# Patient Record
Sex: FEMALE | Race: BLACK OR AFRICAN AMERICAN | Employment: OTHER | URBAN - METROPOLITAN AREA
[De-identification: names, ages, dates, MRNs, and addresses within clinical notes are randomized per-mention and may not be internally consistent; named-entity substitution may affect disease eponyms.]

---

## 2017-04-27 ENCOUNTER — APPOINTMENT (OUTPATIENT)
Dept: PHYSICAL THERAPY | Age: 67
End: 2017-04-27

## 2017-05-20 ENCOUNTER — HOSPITAL ENCOUNTER (OUTPATIENT)
Age: 67
Discharge: HOME | End: 2017-05-20
Attending: OTOLARYNGOLOGY
Payer: MEDICARE

## 2017-05-20 DIAGNOSIS — R42 DIZZINESS AND GIDDINESS: ICD-10-CM

## 2017-05-20 DIAGNOSIS — H90.5 SENSORINEURAL HEARING LOSS: ICD-10-CM

## 2017-05-20 DIAGNOSIS — H93.11 TINNITUS OF RIGHT EAR: ICD-10-CM

## 2017-05-20 DIAGNOSIS — H81.4 VERTIGO OF CENTRAL ORIGIN: ICD-10-CM

## 2017-05-20 DIAGNOSIS — G43.801 OTHER MIGRAINE, NOT INTRACTABLE, WITH STATUS MIGRAINOSUS: ICD-10-CM

## 2017-05-20 DIAGNOSIS — H93.8X2 OTHER SPECIFIED DISORDERS OF LEFT EAR: ICD-10-CM

## 2017-05-20 PROCEDURE — 70551 MRI BRAIN STEM W/O DYE: CPT

## 2017-05-26 ENCOUNTER — OFFICE VISIT (OUTPATIENT)
Dept: FAMILY MEDICINE CLINIC | Age: 67
End: 2017-05-26

## 2017-05-26 VITALS
WEIGHT: 214 LBS | RESPIRATION RATE: 12 BRPM | DIASTOLIC BLOOD PRESSURE: 84 MMHG | HEART RATE: 78 BPM | BODY MASS INDEX: 35.65 KG/M2 | SYSTOLIC BLOOD PRESSURE: 132 MMHG | TEMPERATURE: 98.2 F | HEIGHT: 65 IN | OXYGEN SATURATION: 97 %

## 2017-05-26 DIAGNOSIS — Z13.39 SCREENING FOR ALCOHOLISM: ICD-10-CM

## 2017-05-26 DIAGNOSIS — Z71.89 ACP (ADVANCE CARE PLANNING): ICD-10-CM

## 2017-05-26 DIAGNOSIS — Z00.00 ROUTINE GENERAL MEDICAL EXAMINATION AT A HEALTH CARE FACILITY: Primary | ICD-10-CM

## 2017-05-26 NOTE — PATIENT INSTRUCTIONS
Advance Directives: Care Instructions  Your Care Instructions  An advance directive is a legal way to state your wishes at the end of your life. It tells your family and your doctor what to do if you can no longer say what you want. There are two main types of advance directives. You can change them any time that your wishes change. · A living will tells your family and your doctor your wishes about life support and other treatment. · A durable power of  for health care lets you name a person to make treatment decisions for you when you can't speak for yourself. This person is called a health care agent. If you do not have an advance directive, decisions about your medical care may be made by a doctor or a  who doesn't know you. It may help to think of an advance directive as a gift to the people who care for you. If you have one, they won't have to make tough decisions by themselves. Follow-up care is a key part of your treatment and safety. Be sure to make and go to all appointments, and call your doctor if you are having problems. It's also a good idea to know your test results and keep a list of the medicines you take. How can you care for yourself at home? · Discuss your wishes with your loved ones and your doctor. This way, there are no surprises. · Many states have a unique form. Or you might use a universal form that has been approved by many states. This kind of form can sometimes be completed and stored online. Your electronic copy will then be available wherever you have a connection to the Internet. In most cases, doctors will respect your wishes even if you have a form from a different state. · You don't need a  to do an advance directive. But you may want to get legal advice. · Think about these questions when you prepare an advance directive:  ¨ Who do you want to make decisions about your medical care if you are not able to?  Many people choose a family member or close friend. ¨ Do you know enough about life support methods that might be used? If not, talk to your doctor so you understand. ¨ What are you most afraid of that might happen? You might be afraid of having pain, losing your independence, or being kept alive by machines. ¨ Where would you prefer to die? Choices include your home, a hospital, or a nursing home. ¨ Would you like to have information about hospice care to support you and your family? ¨ Do you want to donate organs when you die? ¨ Do you want certain Religion practices performed before you die? If so, put your wishes in the advance directive. · Read your advance directive every year, and make changes as needed. When should you call for help? Be sure to contact your doctor if you have any questions. Where can you learn more? Go to http://che-meli.info/. Enter R264 in the search box to learn more about \"Advance Directives: Care Instructions. \"  Current as of: November 17, 2016  Content Version: 11.2  © 6804-4542 [employer]. Care instructions adapted under license by [employer] (which disclaims liability or warranty for this information). If you have questions about a medical condition or this instruction, always ask your healthcare professional. [address] any warranty or liability for your use of this information.

## 2017-05-26 NOTE — ACP (ADVANCE CARE PLANNING)
Advance Care Planning (ACP) Provider Conversation Snapshot    Date of ACP Conversation: 05/26/17  Persons included in Conversation:  patient and family  Length of ACP Conversation in minutes:  16 minutes    Authorized Decision Maker (if patient is incapable of making informed decisions):    This person is:   Healthcare Agent/Medical Power of  under Advance Directive          For Patients with Decision Making Capacity:   Values/Goals: Exploration of values, goals, and preferences if recovery is not expected, even with continued medical treatment in the event of:  Imminent death    Conversation Outcomes / Follow-Up Plan:   Recommended completion of Advance Directive form after review of ACP materials and conversation with prospective healthcare agent

## 2017-05-26 NOTE — MR AVS SNAPSHOT
Visit Information Date & Time Provider Department Dept. Phone Encounter #  
 5/26/2017  2:30 PM Joy Reilly MD [facility] [phone] [id number] Upcoming Health Maintenance Date Due Hepatitis C Screening 1950 DTaP/Tdap/Td series (1 - Tdap) 12/3/1973 PAP AKA CERVICAL CYTOLOGY 12/3/1973 FOBT Q 1 YEAR AGE 50-75 12/3/2002 ZOSTER VACCINE AGE 60> 12/3/2012 BREAST CANCER SCRN MAMMOGRAM 3/13/2017 INFLUENZA AGE 9 TO ADULT 8/1/2017 Allergies as of 5/26/2017  Review Complete On: 5/26/2017 By: Joy Reilly MD  
  
 Severity Noted Reaction Type Reactions Codeine  11/15/2016    Other (comments)  
 hallucinations Current Immunizations  Never Reviewed No immunizations on file. Not reviewed this visit You Were Diagnosed With   
  
 Codes Comments Routine general medical examination at a health care facility    -  Primary ICD-10-CM: Z00.00 ICD-9-CM: V70.0 Screening for alcoholism     ICD-10-CM: Z13.89 ICD-9-CM: V79.1 ACP (advance care planning)     ICD-10-CM: Z71.89 ICD-9-CM: V65.49 Vitals BP Pulse Temp Resp Height(growth percentile) Weight(growth percentile) 132/84 (BP 1 Location: Right arm, BP Patient Position: Sitting) 78 98.2 °F (36.8 °C) (Oral) 12 5' 5.25\" (1.657 m) 214 lb (97.1 kg) SpO2 BMI OB Status Smoking Status 97% 35.34 kg/m2 Hysterectomy Never Smoker Vitals History BMI and BSA Data Body Mass Index Body Surface Area  
 35.34 kg/m 2 2.11 m 2 Your Updated Medication List  
  
   
This list is accurate as of: 5/26/17  2:40 PM.  Always use your most recent med list.  
  
  
  
  
 ALPHAGAN P 0.15 % ophthalmic solution Generic drug:  brimonidine  
  
 dorzolamide 2 % ophthalmic solution Commonly known as:  TRUSOPT  
  
 dorzolamide-timolol 22.3-6.8 mg/mL ophthalmic solution Commonly known as:  COSOPT  
  
 erythromycin ophthalmic ointment Commonly known as:  ILOTYCIN  
  
 latanoprost 0.005 % ophthalmic solution Commonly known as:  XALATAN  
  
 multivitamin tablet Commonly known as:  ONE A DAY Take 1 Tab by mouth daily. NIFEdipine ER 60 mg ER tablet Commonly known as:  ADALAT CC Take 60 mg by mouth daily. REFRESH CELLUVISC 1 % ophthalmic solution Generic drug:  carboxymethylcellulose sodium Patient Instructions Advance Directives: Care Instructions Your Care Instructions An advance directive is a legal way to state your wishes at the end of your life. It tells your family and your doctor what to do if you can no longer say what you want. There are two main types of advance directives. You can change them any time that your wishes change. · A living will tells your family and your doctor your wishes about life support and other treatment. · A durable power of  for health care lets you name a person to make treatment decisions for you when you can't speak for yourself. This person is called a health care agent. If you do not have an advance directive, decisions about your medical care may be made by a doctor or a  who doesn't know you. It may help to think of an advance directive as a gift to the people who care for you. If you have one, they won't have to make tough decisions by themselves. Follow-up care is a key part of your treatment and safety. Be sure to make and go to all appointments, and call your doctor if you are having problems. It's also a good idea to know your test results and keep a list of the medicines you take. How can you care for yourself at home? · Discuss your wishes with your loved ones and your doctor. This way, there are no surprises. · Many states have a unique form. Or you might use a universal form that has been approved by many states. This kind of form can sometimes be completed and stored online.  Your electronic copy will then be available wherever you have a connection to the Internet. In most cases, doctors will respect your wishes even if you have a form from a different state. · You don't need a  to do an advance directive. But you may want to get legal advice. · Think about these questions when you prepare an advance directive: ¨ Who do you want to make decisions about your medical care if you are not able to? Many people choose a family member or close friend. ¨ Do you know enough about life support methods that might be used? If not, talk to your doctor so you understand. ¨ What are you most afraid of that might happen? You might be afraid of having pain, losing your independence, or being kept alive by machines. ¨ Where would you prefer to die? Choices include your home, a hospital, or a nursing home. ¨ Would you like to have information about hospice care to support you and your family? ¨ Do you want to donate organs when you die? ¨ Do you want certain Voodoo practices performed before you die? If so, put your wishes in the advance directive. · Read your advance directive every year, and make changes as needed. When should you call for help? Be sure to contact your doctor if you have any questions. Where can you learn more? Go to http://che-meli.info/. Enter R264 in the search box to learn more about \"Advance Directives: Care Instructions. \" Current as of: November 17, 2016 Content Version: 11.2 © 3630-6113 [employer]. Care instructions adapted under license by [employer] (which disclaims liability or warranty for this information). If you have questions about a medical condition or this instruction, always ask your healthcare professional. [address] any warranty or liability for your use of this information. Introducing [facility]!    
 Delisa Jansen introduces Sendbloom patient portal. Now you can access parts of your medical record, email your doctor's office, and request medication refills online. 1. In your internet browser, go to [email or web address] 2. Click on the First Time User? Click Here link in the Sign In box. You will see the New Member Sign Up page. 3. Enter your AutoMed[employer] Access Code exactly as it appears below. You will not need to use this code after youve completed the sign-up process. If you do not sign up before the expiration date, you must request a new code. · Auto[employer] Access Code: [email or web address] Expires: 7/19/2017  8:31 AM 
 
4. Enter the last four digits of your Social Security Number (xxxx) and Date of Birth (mm/dd/yyyy) as indicated and click Submit. You will be taken to the next sign-up page. 5. Create a AutoMedx ID. This will be your AutoMedx login ID and cannot be changed, so think of one that is secure and easy to remember. 6. Create a Auto[employer] password. You can change your password at any time. 7. Enter your Password Reset Question and Answer. This can be used at a later time if you forget your password. 8. Enter your e-mail address. You will receive e-mail notification when new information is available in [address] 9. Click Sign Up. You can now view and download portions of your medical record. 10. Click the Download Summary menu link to download a portable copy of your medical information. If you have questions, please visit the Frequently Asked Questions section of the Auto[employer] website. Remember, Auto[employer] is NOT to be used for urgent needs. For medical emergencies, dial 911. Now available from your iPhone and Android! Please provide this summary of care documentation to your next provider. Your primary care clinician is listed as [address]. If you have any questions after today's visit, please call [phone].

## 2017-05-26 NOTE — PROGRESS NOTES
1. Have you been to the ER, urgent care clinic since your last visit? Hospitalized since your last visit? No    2. Have you seen or consulted any other health care providers outside of the [address] [place] [address] since your last visit? Include any pap smears or colon screening. No    This is an Initial Medicare Annual Wellness Exam (AWV) (Performed 12 months after IPPE or effective date of Medicare Part B enrollment, Once in a lifetime)    I have reviewed the patient's medical history in detail and updated the computerized patient record. History     Past Medical History:   Diagnosis Date    High blood pressure     Menopause       Past Surgical History:   Procedure Laterality Date    HX HYSTERECTOMY      1995    HX OTHER SURGICAL      eye surgery      Current Outpatient Prescriptions   Medication Sig Dispense Refill    NIFEdipine ER (ADALAT CC) 60 mg ER tablet Take 60 mg by mouth daily.  multivitamin (ONE A DAY) tablet Take 1 Tab by mouth daily.       dorzolamide (TRUSOPT) 2 % ophthalmic solution       dorzolamide-timolol (COSOPT) 22.3-6.8 mg/mL ophthalmic solution       ALPHAGAN P 0.15 % ophthalmic solution       latanoprost (XALATAN) 0.005 % ophthalmic solution       REFRESH CELLUVISC 1 % ophthalmic solution       erythromycin (ILOTYCIN) ophthalmic ointment        Allergies   Allergen Reactions    Codeine Other (comments)     hallucinations     Family History   Problem Relation Age of Onset    Breast Cancer Mother     Breast Cancer Other      Social History   Substance Use Topics    Smoking status: Never Smoker    Smokeless tobacco: Not on file    Alcohol use No     Patient Active Problem List   Diagnosis Code    Essential hypertension I10         Depression Risk Factor Screening:     PHQ over the last two weeks 5/26/2017   Little interest or pleasure in doing things Not at all   Feeling down, depressed or hopeless Not at all   Total Score PHQ 2 0     Alcohol Risk Factor Screening: On any occasion during the past 3 months, have you had more than 3 drinks containing alcohol? No    Do you average more than 7 drinks per week? No    Functional Ability and Level of Safety:     Hearing Loss   Whisper test failed. Activities of Daily Living   Self-care. Requires assistance with: no ADLs    Fall Risk   No flowsheet data found. Abuse Screen   Patient is not abused    Review of Systems   Not required    Physical Examination     No exam data present    Evaluation of Cognitive Function:  Mood/affect:  neutral  Appearance: age appropriate and within normal Limits  Family member/caregiver input:  present    No exam performed today, NO exam warranted. Patient Care Team:  Denis Ferreira MD as PCP - [facility])    Advice/Referrals/Counseling   Education and counseling provided:  End-of-Life planning (with patient's consent)      Assessment/Plan   Patient given information on Advanced Directive.     Copies of 5 year plan given to patient    Denis Ferreira MD

## 2017-06-20 ENCOUNTER — PATIENT OUTREACH (OUTPATIENT)
Dept: FAMILY MEDICINE CLINIC | Age: 67
End: 2017-06-20

## 2017-06-21 ENCOUNTER — PATIENT OUTREACH (OUTPATIENT)
Dept: FAMILY MEDICINE CLINIC | Age: 67
End: 2017-06-21

## 2017-07-13 ENCOUNTER — PATIENT OUTREACH (OUTPATIENT)
Dept: FAMILY MEDICINE CLINIC | Age: 67
End: 2017-07-13

## 2017-07-13 ENCOUNTER — TELEPHONE (OUTPATIENT)
Dept: FAMILY MEDICINE CLINIC | Age: 67
End: 2017-07-13

## 2017-07-13 DIAGNOSIS — Z12.31 ENCOUNTER FOR SCREENING MAMMOGRAM FOR BREAST CANCER: Primary | ICD-10-CM

## 2017-07-26 ENCOUNTER — PATIENT OUTREACH (OUTPATIENT)
Dept: FAMILY MEDICINE CLINIC | Age: 67
End: 2017-07-26

## 2017-08-14 ENCOUNTER — PATIENT OUTREACH (OUTPATIENT)
Dept: FAMILY MEDICINE CLINIC | Age: 67
End: 2017-08-14

## 2017-09-06 ENCOUNTER — PATIENT OUTREACH (OUTPATIENT)
Dept: FAMILY MEDICINE CLINIC | Age: 67
End: 2017-09-06

## 2017-09-06 NOTE — PROGRESS NOTES
[place] health screenings:    Ms Giulia Peñaloza is deaf so I have been attempting to complete her screenings or @ least requesting the doctor's name where she may have completed her screenings, through her . Mr Giulia Peñaloza has always been very cordial during our conversations but is not always responding to my calls or my suggestions for care and f/u. I am therefore closing this episode of care and I have left him a message this morning to let him know I would not be calling again but encouraged him when he is serious about completing the screenings for his wife I would be more than willing to work with him.

## 2017-12-11 ENCOUNTER — TELEPHONE (OUTPATIENT)
Dept: FAMILY MEDICINE CLINIC | Age: 67
End: 2017-12-11

## 2017-12-11 NOTE — TELEPHONE ENCOUNTER
called the office today and states he needs his wife seen, stated it was emergent. She is forgetting stuff and having psychiatric issues. He states he is coming up here and sitting until she can be seen. I asked him to please hold and made Go Felder aware and had her talk to him and triage the call.

## 2017-12-11 NOTE — TELEPHONE ENCOUNTER
Spoke to Mr. Claudia Landry. I could hear patient yelling in the background. I asked him what is going on and he said that patient has not been taking her medication, she is 'self diagnosing', her anxiety his high, she is 'not being very nice', screaming, and pacing back and forth. After consulting with dr Ben Greene, he stated that pt needs to go to ER for eval. Gave message to Mr. Claudia Landry. He said 'well I'm bringing her there and will wait to be seen.' I advised him to take her to the ER as there is nothing we can do for her here. He agreed to this plan. Sadaf Kincaid

## 2017-12-15 ENCOUNTER — OFFICE VISIT (OUTPATIENT)
Dept: FAMILY MEDICINE CLINIC | Age: 67
End: 2017-12-15

## 2017-12-15 ENCOUNTER — HOSPITAL ENCOUNTER (OUTPATIENT)
Dept: LAB | Age: 67
Discharge: HOME | End: 2017-12-15
Payer: MEDICARE

## 2017-12-15 VITALS
SYSTOLIC BLOOD PRESSURE: 136 MMHG | HEART RATE: 79 BPM | OXYGEN SATURATION: 98 % | RESPIRATION RATE: 16 BRPM | HEIGHT: 66 IN | DIASTOLIC BLOOD PRESSURE: 74 MMHG | BODY MASS INDEX: 31.18 KG/M2 | WEIGHT: 194 LBS

## 2017-12-15 DIAGNOSIS — E66.09 CLASS 1 OBESITY DUE TO EXCESS CALORIES WITHOUT SERIOUS COMORBIDITY WITH BODY MASS INDEX (BMI) OF 31.0 TO 31.9 IN ADULT: ICD-10-CM

## 2017-12-15 DIAGNOSIS — I10 ESSENTIAL HYPERTENSION: ICD-10-CM

## 2017-12-15 DIAGNOSIS — I10 ESSENTIAL HYPERTENSION: Primary | ICD-10-CM

## 2017-12-15 DIAGNOSIS — Z11.59 NEED FOR HEPATITIS C SCREENING TEST: ICD-10-CM

## 2017-12-15 DIAGNOSIS — Z96.21 COCHLEAR IMPLANT IN PLACE: ICD-10-CM

## 2017-12-15 LAB
ALBUMIN SERPL-MCNC: 3.5 G/DL (ref 3.4–5)
ALBUMIN/GLOB SERPL: 0.9 {RATIO} (ref 0.8–1.7)
ALP SERPL-CCNC: 91 U/L (ref 45–117)
ALT SERPL-CCNC: 32 U/L (ref 13–56)
ANION GAP SERPL CALC-SCNC: 8 MMOL/L (ref 3–18)
AST SERPL-CCNC: 28 U/L (ref 15–37)
BASOPHILS # BLD: 0 K/UL (ref 0–0.06)
BASOPHILS NFR BLD: 0 % (ref 0–2)
BILIRUB SERPL-MCNC: 0.3 MG/DL (ref 0.2–1)
BUN SERPL-MCNC: 28 MG/DL (ref 7–18)
BUN/CREAT SERPL: 24 (ref 12–20)
CALCIUM SERPL-MCNC: 9.4 MG/DL (ref 8.5–10.1)
CHLORIDE SERPL-SCNC: 103 MMOL/L (ref 100–108)
CHOLEST SERPL-MCNC: 219 MG/DL
CO2 SERPL-SCNC: 29 MMOL/L (ref 21–32)
CREAT SERPL-MCNC: 1.18 MG/DL (ref 0.6–1.3)
DIFFERENTIAL METHOD BLD: ABNORMAL
EOSINOPHIL # BLD: 0 K/UL (ref 0–0.4)
EOSINOPHIL NFR BLD: 0 % (ref 0–5)
ERYTHROCYTE [DISTWIDTH] IN BLOOD BY AUTOMATED COUNT: 14.8 % (ref 11.6–14.5)
GLOBULIN SER CALC-MCNC: 3.8 G/DL (ref 2–4)
GLUCOSE SERPL-MCNC: 82 MG/DL (ref 74–99)
HCT VFR BLD AUTO: 36.2 % (ref 35–45)
HDLC SERPL-MCNC: 97 MG/DL (ref 40–60)
HDLC SERPL: 2.3 {RATIO} (ref 0–5)
HGB BLD-MCNC: 11.2 G/DL (ref 12–16)
LDLC SERPL CALC-MCNC: 109.6 MG/DL (ref 0–100)
LIPID PROFILE,FLP: ABNORMAL
LYMPHOCYTES # BLD: 1.5 K/UL (ref 0.9–3.6)
LYMPHOCYTES NFR BLD: 28 % (ref 21–52)
MCH RBC QN AUTO: 29.2 PG (ref 24–34)
MCHC RBC AUTO-ENTMCNC: 30.9 G/DL (ref 31–37)
MCV RBC AUTO: 94.5 FL (ref 74–97)
MONOCYTES # BLD: 0.5 K/UL (ref 0.05–1.2)
MONOCYTES NFR BLD: 11 % (ref 3–10)
NEUTS SEG # BLD: 3.1 K/UL (ref 1.8–8)
NEUTS SEG NFR BLD: 61 % (ref 40–73)
PLATELET # BLD AUTO: 195 K/UL (ref 135–420)
PMV BLD AUTO: 11.6 FL (ref 9.2–11.8)
POTASSIUM SERPL-SCNC: 4 MMOL/L (ref 3.5–5.5)
PROT SERPL-MCNC: 7.3 G/DL (ref 6.4–8.2)
RBC # BLD AUTO: 3.83 M/UL (ref 4.2–5.3)
SODIUM SERPL-SCNC: 140 MMOL/L (ref 136–145)
TRIGL SERPL-MCNC: 62 MG/DL (ref ?–150)
TSH SERPL DL<=0.05 MIU/L-ACNC: 0.95 UIU/ML (ref 0.36–3.74)
VLDLC SERPL CALC-MCNC: 12.4 MG/DL
WBC # BLD AUTO: 5.1 K/UL (ref 4.6–13.2)

## 2017-12-15 PROCEDURE — 84443 ASSAY THYROID STIM HORMONE: CPT | Performed by: FAMILY MEDICINE

## 2017-12-15 PROCEDURE — 86803 HEPATITIS C AB TEST: CPT | Performed by: FAMILY MEDICINE

## 2017-12-15 PROCEDURE — 80053 COMPREHEN METABOLIC PANEL: CPT | Performed by: FAMILY MEDICINE

## 2017-12-15 PROCEDURE — 80061 LIPID PANEL: CPT | Performed by: FAMILY MEDICINE

## 2017-12-15 PROCEDURE — 85025 COMPLETE CBC W/AUTO DIFF WBC: CPT | Performed by: FAMILY MEDICINE

## 2017-12-15 RX ORDER — VENLAFAXINE HYDROCHLORIDE 37.5 MG/1
CAPSULE, EXTENDED RELEASE ORAL
COMMUNITY
Start: 2017-10-15

## 2017-12-15 NOTE — PATIENT INSTRUCTIONS
High Blood Pressure: Care Instructions  Your Care Instructions    If your blood pressure is usually above 140/90, you have high blood pressure, or hypertension. That means the top number is 140 or higher or the bottom number is 90 or higher, or both. Despite what a lot of people think, high blood pressure usually doesn't cause headaches or make you feel dizzy or lightheaded. It usually has no symptoms. But it does increase your risk for heart attack, stroke, and kidney or eye damage. The higher your blood pressure, the more your risk increases. Your doctor will give you a goal for your blood pressure. Your goal will be based on your health and your age. An example of a goal is to keep your blood pressure below 140/90. Lifestyle changes, such as eating healthy and being active, are always important to help lower blood pressure. You might also take medicine to reach your blood pressure goal.  Follow-up care is a key part of your treatment and safety. Be sure to make and go to all appointments, and call your doctor if you are having problems. It's also a good idea to know your test results and keep a list of the medicines you take. How can you care for yourself at home? Medical treatment  · If you stop taking your medicine, your blood pressure will go back up. You may take one or more types of medicine to lower your blood pressure. Be safe with medicines. Take your medicine exactly as prescribed. Call your doctor if you think you are having a problem with your medicine. · Talk to your doctor before you start taking aspirin every day. Aspirin can help certain people lower their risk of a heart attack or stroke. But taking aspirin isn't right for everyone, because it can cause serious bleeding. · See your doctor regularly. You may need to see the doctor more often at first or until your blood pressure comes down.   · If you are taking blood pressure medicine, talk to your doctor before you take decongestants or anti-inflammatory medicine, such as ibuprofen. Some of these medicines can raise blood pressure. · Learn how to check your blood pressure at home. Lifestyle changes  · Stay at a healthy weight. This is especially important if you put on weight around the waist. Losing even 10 pounds can help you lower your blood pressure. · If your doctor recommends it, get more exercise. Walking is a good choice. Bit by bit, increase the amount you walk every day. Try for at least 30 minutes on most days of the week. You also may want to swim, bike, or do other activities. · Avoid or limit alcohol. Talk to your doctor about whether you can drink any alcohol. · Try to limit how much sodium you eat to less than 2,300 milligrams (mg) a day. Your doctor may ask you to try to eat less than 1,500 mg a day. · Eat plenty of fruits (such as bananas and oranges), vegetables, legumes, whole grains, and low-fat dairy products. · Lower the amount of saturated fat in your diet. Saturated fat is found in animal products such as milk, cheese, and meat. Limiting these foods may help you lose weight and also lower your risk for heart disease. · Do not smoke. Smoking increases your risk for heart attack and stroke. If you need help quitting, talk to your doctor about stop-smoking programs and medicines. These can increase your chances of quitting for good. When should you call for help? Call 911 anytime you think you may need emergency care. This may mean having symptoms that suggest that your blood pressure is causing a serious heart or blood vessel problem. Your blood pressure may be over 180/110. ? For example, call 911 if:  ? · You have symptoms of a heart attack. These may include:  ¨ Chest pain or pressure, or a strange feeling in the chest.  ¨ Sweating. ¨ Shortness of breath. ¨ Nausea or vomiting.   ¨ Pain, pressure, or a strange feeling in the back, neck, jaw, or upper belly or in one or both shoulders or arms.  ¨ Lightheadedness or sudden weakness. ¨ A fast or irregular heartbeat. ? · You have symptoms of a stroke. These may include:  ¨ Sudden numbness, tingling, weakness, or loss of movement in your face, arm, or leg, especially on only one side of your body. ¨ Sudden vision changes. ¨ Sudden trouble speaking. ¨ Sudden confusion or trouble understanding simple statements. ¨ Sudden problems with walking or balance. ¨ A sudden, severe headache that is different from past headaches. ? · You have severe back or belly pain. ?Do not wait until your blood pressure comes down on its own. Get help right away. ?Call your doctor now or seek immediate care if:  ? · Your blood pressure is much higher than normal (such as 180/110 or higher), but you don't have symptoms. ? · You think high blood pressure is causing symptoms, such as:  ¨ Severe headache. ¨ Blurry vision. ? Watch closely for changes in your health, and be sure to contact your doctor if:  ? · Your blood pressure measures 140/90 or higher at least 2 times. That means the top number is 140 or higher or the bottom number is 90 or higher, or both. ? · You think you may be having side effects from your blood pressure medicine. ? · Your blood pressure is usually normal, but it goes above normal at least 2 times. Where can you learn more? Go to http://che-meli.info/. Enter A437 in the search box to learn more about \"High Blood Pressure: Care Instructions. \"  Current as of: September 21, 2016  Content Version: 11.4  © 4274-8061 [employer]. Care instructions adapted under license by [employer] (which disclaims liability or warranty for this information). If you have questions about a medical condition or this instruction, always ask your healthcare professional. [place] [address] any warranty or liability for your use of this information.            Body Mass Index: Care Instructions  Your Care Instructions    Body mass index (BMI) can help you see if your weight is raising your risk for health problems. It uses a formula to compare how much you weigh with how tall you are. · A BMI lower than 18.5 is considered underweight. · A BMI between 18.5 and 24.9 is considered healthy. · A BMI between 25 and 29.9 is considered overweight. A BMI of 30 or higher is considered obese. If your BMI is in the normal range, it means that you have a lower risk for weight-related health problems. If your BMI is in the overweight or obese range, you may be at increased risk for weight-related health problems, such as high blood pressure, heart disease, stroke, arthritis or joint pain, and diabetes. If your BMI is in the underweight range, you may be at increased risk for health problems such as fatigue, lower protection (immunity) against illness, muscle loss, bone loss, hair loss, and hormone problems. BMI is just one measure of your risk for weight-related health problems. You may be at higher risk for health problems if you are not active, you eat an unhealthy diet, or you drink too much alcohol or use tobacco products. Follow-up care is a key part of your treatment and safety. Be sure to make and go to all appointments, and call your doctor if you are having problems. It's also a good idea to know your test results and keep a list of the medicines you take. How can you care for yourself at home? · Practice healthy eating habits. This includes eating plenty of fruits, vegetables, whole grains, lean protein, and low-fat dairy. · If your doctor recommends it, get more exercise. Walking is a good choice. Bit by bit, increase the amount you walk every day. Try for at least 30 minutes on most days of the week. · Do not smoke. Smoking can increase your risk for health problems. If you need help quitting, talk to your doctor about stop-smoking programs and medicines.  These can increase your chances of quitting for good. · Limit alcohol to 2 drinks a day for men and 1 drink a day for women. Too much alcohol can cause health problems. If you have a BMI higher than 25  · Your doctor may do other tests to check your risk for weight-related health problems. This may include measuring the distance around your waist. A waist measurement of more than 40 inches in men or 35 inches in women can increase the risk of weight-related health problems. · Talk with your doctor about steps you can take to stay healthy or improve your health. You may need to make lifestyle changes to lose weight and stay healthy, such as changing your diet and getting regular exercise. If you have a BMI lower than 18.5  · Your doctor may do other tests to check your risk for health problems. · Talk with your doctor about steps you can take to stay healthy or improve your health. You may need to make lifestyle changes to gain or maintain weight and stay healthy, such as getting more healthy foods in your diet and doing exercises to build muscle. Where can you learn more? Go to http://che-meli.info/. Enter S176 in the search box to learn more about \"Body Mass Index: Care Instructions. \"  Current as of: October 13, 2016  Content Version: 11.4  © 9770-0333 [employer]. Care instructions adapted under license by [employer] (which disclaims liability or warranty for this information). If you have questions about a medical condition or this instruction, always ask your healthcare professional. [address] any warranty or liability for your use of this information.

## 2017-12-15 NOTE — MR AVS SNAPSHOT
Visit Information Date & Time Provider Department Dept. Phone Encounter #  
 12/15/2017  9:45 AM Mariel Payton MD [facility] [phone] [id number] Follow-up Instructions Return in about 3 months (around 3/15/2018). Upcoming Health Maintenance Date Due Hepatitis C Screening 1950 DTaP/Tdap/Td series (1 - Tdap) 12/3/1973 PAP AKA CERVICAL CYTOLOGY 12/3/1973 FOBT Q 1 YEAR AGE 50-75 12/3/2002 ZOSTER VACCINE AGE 60> 10/3/2012 Influenza Age 5 to Adult 8/1/2017 GLAUCOMA SCREENING Q2Y 12/3/2017 OSTEOPOROSIS SCREENING (DEXA) 12/3/2017 Pneumococcal 65+ Low/Medium Risk (1 of 2 - PCV13) 12/3/2017 MEDICARE YEARLY EXAM 5/27/2018 Allergies as of 12/15/2017  Review Complete On: 12/15/2017 By: Mariel Payton MD  
  
 Severity Noted Reaction Type Reactions Codeine  11/15/2016    Other (comments)  
 hallucinations Oxycodone  09/20/2017    Unknown (comments) Current Immunizations  Never Reviewed No immunizations on file. Not reviewed this visit You Were Diagnosed With   
  
 Codes Comments Essential hypertension    -  Primary ICD-10-CM: I10 
ICD-9-CM: 401.9 Cochlear implant in place     ICD-10-CM: Z96.21 
ICD-9-CM: V45.89 Need for hepatitis C screening test     ICD-10-CM: Z11.59 
ICD-9-CM: V73.89 Vitals BP Pulse Resp Height(growth percentile) Weight(growth percentile) SpO2  
 136/74 79 16 5' 6\" (1.676 m) 194 lb (88 kg) 98% BMI OB Status Smoking Status 31.31 kg/m2 Hysterectomy Never Smoker Vitals History BMI and BSA Data Body Mass Index Body Surface Area  
 31.31 kg/m 2 2.02 m 2 Preferred Pharmacy Pharmacy Name Phone [facility] [phone] Your Updated Medication List  
  
   
This list is accurate as of: 12/15/17 10:36 AM.  Always use your most recent med list.  
  
  
  
  
 ALPHAGAN P 0.15 % ophthalmic solution Generic drug:  brimonidine  
  
 dorzolamide 2 % ophthalmic solution Commonly known as:  TRUSOPT  
  
 multivitamin tablet Commonly known as:  ONE A DAY Take 1 Tab by mouth daily. NIFEdipine ER 60 mg ER tablet Commonly known as:  ADALAT CC Take 60 mg by mouth daily. REFRESH CELLUVISC 1 % ophthalmic solution Generic drug:  carboxymethylcellulose sodium  
as needed. venlafaxine-SR 37.5 mg capsule Commonly known as:  EFFEXOR-XR Follow-up Instructions Return in about 3 months (around 3/15/2018). To-Do List   
 12/15/2017 Lab:  CBC WITH AUTOMATED DIFF   
  
 12/15/2017 Lab:  HEPATITIS C AB   
  
 12/15/2017 Lab:  LIPID PANEL   
  
 12/15/2017 Lab:  METABOLIC PANEL, COMPREHENSIVE   
  
 12/15/2017 Lab:  TSH 3RD GENERATION Patient Instructions High Blood Pressure: Care Instructions Your Care Instructions If your blood pressure is usually above 140/90, you have high blood pressure, or hypertension. That means the top number is 140 or higher or the bottom number is 90 or higher, or both. Despite what a lot of people think, high blood pressure usually doesn't cause headaches or make you feel dizzy or lightheaded. It usually has no symptoms. But it does increase your risk for heart attack, stroke, and kidney or eye damage. The higher your blood pressure, the more your risk increases. Your doctor will give you a goal for your blood pressure. Your goal will be based on your health and your age. An example of a goal is to keep your blood pressure below 140/90. Lifestyle changes, such as eating healthy and being active, are always important to help lower blood pressure. You might also take medicine to reach your blood pressure goal. 
Follow-up care is a key part of your treatment and safety.  Be sure to make and go to all appointments, and call your doctor if you are having problems. It's also a good idea to know your test results and keep a list of the medicines you take. How can you care for yourself at home? Medical treatment · If you stop taking your medicine, your blood pressure will go back up. You may take one or more types of medicine to lower your blood pressure. Be safe with medicines. Take your medicine exactly as prescribed. Call your doctor if you think you are having a problem with your medicine. · Talk to your doctor before you start taking aspirin every day. Aspirin can help certain people lower their risk of a heart attack or stroke. But taking aspirin isn't right for everyone, because it can cause serious bleeding. · See your doctor regularly. You may need to see the doctor more often at first or until your blood pressure comes down. · If you are taking blood pressure medicine, talk to your doctor before you take decongestants or anti-inflammatory medicine, such as ibuprofen. Some of these medicines can raise blood pressure. · Learn how to check your blood pressure at home. Lifestyle changes · Stay at a healthy weight. This is especially important if you put on weight around the waist. Losing even 10 pounds can help you lower your blood pressure. · If your doctor recommends it, get more exercise. Walking is a good choice. Bit by bit, increase the amount you walk every day. Try for at least 30 minutes on most days of the week. You also may want to swim, bike, or do other activities. · Avoid or limit alcohol. Talk to your doctor about whether you can drink any alcohol. · Try to limit how much sodium you eat to less than 2,300 milligrams (mg) a day. Your doctor may ask you to try to eat less than 1,500 mg a day. · Eat plenty of fruits (such as bananas and oranges), vegetables, legumes, whole grains, and low-fat dairy products. · Lower the amount of saturated fat in your diet.  Saturated fat is found in animal products such as milk, cheese, and meat. Limiting these foods may help you lose weight and also lower your risk for heart disease. · Do not smoke. Smoking increases your risk for heart attack and stroke. If you need help quitting, talk to your doctor about stop-smoking programs and medicines. These can increase your chances of quitting for good. When should you call for help? Call 911 anytime you think you may need emergency care. This may mean having symptoms that suggest that your blood pressure is causing a serious heart or blood vessel problem. Your blood pressure may be over 180/110. ? For example, call 911 if: 
? · You have symptoms of a heart attack. These may include: ¨ Chest pain or pressure, or a strange feeling in the chest. 
¨ Sweating. ¨ Shortness of breath. ¨ Nausea or vomiting. ¨ Pain, pressure, or a strange feeling in the back, neck, jaw, or upper belly or in one or both shoulders or arms. ¨ Lightheadedness or sudden weakness. ¨ A fast or irregular heartbeat. ? · You have symptoms of a stroke. These may include: 
¨ Sudden numbness, tingling, weakness, or loss of movement in your face, arm, or leg, especially on only one side of your body. ¨ Sudden vision changes. ¨ Sudden trouble speaking. ¨ Sudden confusion or trouble understanding simple statements. ¨ Sudden problems with walking or balance. ¨ A sudden, severe headache that is different from past headaches. ? · You have severe back or belly pain. ?Do not wait until your blood pressure comes down on its own. Get help right away. ?Call your doctor now or seek immediate care if: 
? · Your blood pressure is much higher than normal (such as 180/110 or higher), but you don't have symptoms. ? · You think high blood pressure is causing symptoms, such as: ¨ Severe headache. ¨ Blurry vision. ? Watch closely for changes in your health, and be sure to contact your doctor if: ? · Your blood pressure measures 140/90 or higher at least 2 times. That means the top number is 140 or higher or the bottom number is 90 or higher, or both. ? · You think you may be having side effects from your blood pressure medicine. ? · Your blood pressure is usually normal, but it goes above normal at least 2 times. Where can you learn more? Go to http://che-meli.info/. Enter V203 in the search box to learn more about \"High Blood Pressure: Care Instructions. \" Current as of: September 21, 2016 Content Version: 11.4 © 4643-5539 [employer]. Care instructions adapted under license by [employer] (which disclaims liability or warranty for this information). If you have questions about a medical condition or this instruction, always ask your healthcare professional. [address] any warranty or liability for your use of this information. Introducing [facility]! Nilsa Latif introduces AlertMe patient portal. Now you can access parts of your medical record, email your doctor's office, and request medication refills online. 1. In your internet browser, go to [email or web address] 2. Click on the First Time User? Click Here link in the Sign In box. You will see the New Member Sign Up page. 3. Enter your AlertMe Access Code exactly as it appears below. You will not need to use this code after youve completed the sign-up process. If you do not sign up before the expiration date, you must request a new code. · AlertMe Access Code: [email or web address] Expires: 3/15/2018 10:36 AM 
 
4. Enter the last four digits of your Social Security Number (xxxx) and Date of Birth (mm/dd/yyyy) as indicated and click Submit. You will be taken to the next sign-up page. 5. Create a AlertMe ID. This will be your AlertMe login ID and cannot be changed, so think of one that is secure and easy to remember. 6. Create a "[employer] password. You can change your password at any time. 7. Enter your Password Reset Question and Answer. This can be used at a later time if you forget your password. 8. Enter your e-mail address. You will receive e-mail notification when new information is available in [address] 9. Click Sign Up. You can now view and download portions of your medical record. 10. Click the Download Summary menu link to download a portable copy of your medical information. If you have questions, please visit the Frequently Asked Questions section of the "[employer] website. Remember, "[employer] is NOT to be used for urgent needs. For medical emergencies, dial 911. Now available from your iPhone and Android! Please provide this summary of care documentation to your next provider. Your primary care clinician is listed as BOBBI HAYS. If you have any questions after today's visit, please call [phone].

## 2017-12-15 NOTE — PROGRESS NOTES
Armin Simpson is a 74 y.o. female  presents for follow up. Allergies   Allergen Reactions    Codeine Other (comments)     hallucinations    Oxycodone Unknown (comments)     Outpatient Prescriptions Marked as Taking for the 12/15/17 encounter (Office Visit) with Carlton Enrique MD   Medication Sig Dispense Refill    NIFEdipine ER (ADALAT CC) 60 mg ER tablet Take 60 mg by mouth daily.  ALPHAGAN P 0.15 % ophthalmic solution       multivitamin (ONE A DAY) tablet Take 1 Tab by mouth daily.  REFRESH CELLUVISC 1 % ophthalmic solution as needed.  dorzolamide (TRUSOPT) 2 % ophthalmic solution        Patient Active Problem List   Diagnosis Code    Essential hypertension I10    ACP (advance care planning) Z71.89     Past Medical History:   Diagnosis Date    Communication problem     due to severe hearing loss    Dizziness     Glaucoma     Hearing loss of both ears     left greater than right    High blood pressure     Menopause     Migraine     Unsteady gait      Social History     Social History    Marital status:      Spouse name: N/A    Number of children: N/A    Years of education: N/A     Social History Main Topics    Smoking status: Never Smoker    Smokeless tobacco: Never Used    Alcohol use No    Drug use: No    Sexual activity: Yes     Other Topics Concern    None     Social History Narrative     Family History   Problem Relation Age of Onset    Breast Cancer Mother     Diabetes Mother     Breast Cancer Other     Kidney Disease Brother         Review of Systems   Constitutional: Negative for chills and fever. Gastrointestinal: Negative for constipation, diarrhea, nausea and vomiting.      Vitals:    12/15/17 1002   BP: 136/74   Pulse: 79   Resp: 16   SpO2: 98%   Weight: 194 lb (88 kg)   Height: 5' 6\" (1.676 m)   PainSc:   0 - No pain       Physical Exam   Constitutional: She is oriented to person, place, and time and well-developed, well-nourished, and in no distress. Neck: Normal range of motion. Neck supple. Cardiovascular: Normal rate, regular rhythm and normal heart sounds. Pulmonary/Chest: Effort normal and breath sounds normal.   Neurological: She is alert and oriented to person, place, and time. Skin: Skin is warm and dry. Nursing note and vitals reviewed. Assessment/Plan      ICD-10-CM ICD-9-CM    1. Essential hypertension J41 465.2 METABOLIC PANEL, COMPREHENSIVE      CBC WITH AUTOMATED DIFF      LIPID PANEL      TSH 3RD GENERATION   2. Cochlear implant in place Z96.21 V45.89    3. Need for hepatitis C screening test Z11.59 V73.89 HEPATITIS C AB     I have discussed the diagnosis with the patient and the intended plan of care as seen in the above orders. The patient has received an after-visit summary and questions were answered concerning future plans. I have discussed medication, side effects, and warnings with the patient in detail. The patient verbalized understanding and is in agreement with the plan of care. The patient will contact the office with any additional concerns. Follow-up Disposition:  Return in about 3 months (around 3/15/2018). lab results and schedule of future lab studies reviewed with patient    Discussed the patient's BMI with her. The BMI follow up plan is as follows:     dietary management education, guidance, and counseling  encourage exercise  monitor weight  prescribed dietary intake    An After Visit Summary was printed and given to the patient.     Ishmael Nickerson MD

## 2017-12-15 NOTE — PROGRESS NOTES
Chief Complaint   Patient presents with    Hypertension    Anxiety     1. Have you been to the ER, urgent care clinic since your last visit? Hospitalized since your last visit? No    2. Have you seen or consulted any other health care providers outside of the [address] [place] [address] since your last visit? Include any pap smears or colon screening.  No

## 2017-12-19 LAB
HCV AB SER IA-ACNC: 0.03 INDEX
HCV AB SERPL QL IA: NEGATIVE
HCV COMMENT,HCGAC: NORMAL

## 2018-06-27 ENCOUNTER — OFFICE VISIT (OUTPATIENT)
Dept: FAMILY MEDICINE CLINIC | Age: 68
End: 2018-06-27

## 2018-06-27 VITALS
HEIGHT: 66 IN | TEMPERATURE: 97.8 F | BODY MASS INDEX: 31.53 KG/M2 | SYSTOLIC BLOOD PRESSURE: 127 MMHG | RESPIRATION RATE: 12 BRPM | HEART RATE: 70 BPM | OXYGEN SATURATION: 99 % | DIASTOLIC BLOOD PRESSURE: 76 MMHG | WEIGHT: 196.2 LBS

## 2018-06-27 DIAGNOSIS — L01.00 IMPETIGO: Primary | ICD-10-CM

## 2018-06-27 RX ORDER — CEPHALEXIN 500 MG/1
500 CAPSULE ORAL 4 TIMES DAILY
Qty: 40 CAP | Refills: 0 | Status: SHIPPED | OUTPATIENT
Start: 2018-06-27 | End: 2018-07-07

## 2018-06-27 NOTE — MR AVS SNAPSHOT
[facility] [address] [place] [address] Road 
[phone] Patient: Shelly De La Cruz MRN: [id number] UZN:[id number] Visit Information Date & Time Provider Department Dept. Phone Encounter #  
 6/27/2018 10:15 AM Pacheco Mcgrath MD [facility] [phone] [id number] Follow-up Instructions Return if symptoms worsen or fail to improve. Upcoming Health Maintenance Date Due FOBT Q 1 YEAR AGE 50-75 12/3/2002 BREAST CANCER SCRN MAMMOGRAM 3/13/2017 GLAUCOMA SCREENING Q2Y 12/3/2017 Bone Densitometry (Dexa) Screening 12/3/2017 MEDICARE YEARLY EXAM 5/27/2018 Influenza Age 5 to Adult 8/1/2018 Pneumococcal 65+ Low/Medium Risk (2 of 2 - PPSV23) 12/15/2018 DTaP/Tdap/Td series (2 - Td) 12/15/2027 Allergies as of 6/27/2018  Review Complete On: 6/27/2018 By: Pacheco Mcgrath MD  
  
 Severity Noted Reaction Type Reactions Codeine  11/15/2016    Other (comments)  
 hallucinations Oxycodone  09/20/2017    Unknown (comments) Current Immunizations  Never Reviewed No immunizations on file. Not reviewed this visit You Were Diagnosed With   
  
 Codes Comments Impetigo    -  Primary ICD-10-CM: L01.00 ICD-9-CM: 372 Vitals BP Pulse Temp Resp Height(growth percentile) Weight(growth percentile) 127/76 (BP 1 Location: Right arm, BP Patient Position: Sitting) 70 97.8 °F (36.6 °C) (Oral) 12 5' 6\" (1.676 m) 196 lb 3.2 oz (89 kg) SpO2 BMI OB Status Smoking Status 99% 31.67 kg/m2 Hysterectomy Never Smoker BMI and BSA Data Body Mass Index Body Surface Area  
 31.67 kg/m 2 2.04 m 2 Preferred Pharmacy Pharmacy Name Phone [facility] [phone] [phone] Your Updated Medication List  
  
   
This list is accurate as of 6/27/18 10:42 AM.  Always use your most recent med list.  
  
  
  
 ALPHAGAN P 0.15 % ophthalmic solution Generic drug:  brimonidine  
  
 cephALEXin 500 mg capsule Commonly known as:  Prudence Leavens Take 1 Cap by mouth four (4) times daily for 10 days. dorzolamide 2 % ophthalmic solution Commonly known as:  TRUSOPT  
  
 multivitamin tablet Commonly known as:  ONE A DAY Take 1 Tab by mouth daily. NIFEdipine ER 60 mg ER tablet Commonly known as:  ADALAT CC Take 60 mg by mouth daily. REFRESH CELLUVISC 1 % ophthalmic solution Generic drug:  carboxymethylcellulose sodium  
as needed. venlafaxine-SR 37.5 mg capsule Commonly known as:  EFFEXOR-XR Prescriptions Sent to Pharmacy Refills  
 cephALEXin (KEFLEX) 500 mg capsule 0 Sig: Take 1 Cap by mouth four (4) times daily for 10 days. Class: Normal  
 Pharmacy: [facility] [address] [place] [address] #: [phone] Route: Oral  
  
Follow-up Instructions Return if symptoms worsen or fail to improve. Patient Instructions Impetigo: Care Instructions Your Care Instructions Impetigo (say \"jc-ijt-TC-go\") is a skin infection caused by bacteria. It causes blisters that break and become oozing, yellow, crusty sores. Impetigo can be anywhere on the body. Scratching the sores may spread the infection to other parts of the body. You can also spread it to others through close contact or when you share towels, clothing, and other items. Prescription antibiotic ointment or pills can usually cure impetigo. (After a day of antibiotics, the infection should not spread.) Follow-up care is a key part of your treatment and safety. Be sure to make and go to all appointments, and call your doctor if you are having problems. It's also a good idea to know your test results and keep a list of the medicines you take. How can you care for yourself at home? · Apply antibiotic ointment exactly as instructed. · If your doctor prescribed antibiotic pills, take them as directed. Do not stop using them just because you feel better. You need to take the full course of antibiotics. · Gently wash the sores with soap and water each day. If crusts form, your doctor may advise you to soften or remove the crusts. You can do this by soaking them in warm water and patting them dry. This can help the cream or ointment treat impetigo. · After you touch the area, wash your hands with soap and water. Or you can use an alcohol-based hand . · Don't share items such as towels, sheets, and clothing until the infection is gone. · Wash anything that may have touched the infected area. · Try to avoid scratching the area. When should you call for help? Call your doctor now or seek immediate medical care if: 
? · You have symptoms of a worse infection, such as: 
¨ Increased pain, swelling, warmth, or redness. ¨ Red streaks leading from the area. ¨ Pus draining from the area. ¨ A fever. ? · Impetigo gets worse or spreads to other areas. ? Watch closely for changes in your health, and be sure to contact your doctor if: 
? · You do not get better as expected. Where can you learn more? Go to http://che-meli.info/. Enter R202 in the search box to learn more about \"Impetigo: Care Instructions. \" Current as of: May 12, 2017 Content Version: 11.4 © 6662-0908 [employer]X. Care instructions adapted under license by [employer] (which disclaims liability or warranty for this information). If you have questions about a medical condition or this instruction, always ask your healthcare professional. [place] [address] any warranty or liability for your use of this information. Introducing hospitals & HEALTH SERVICES!    
 Miles Lema introduces [employer]Data patient portal. Now you can access parts of your medical record, email your doctor's office, and request medication refills online. 1. In your internet browser, go to [email or web address] 2. Click on the First Time User? Click Here link in the Sign In box. You will see the New Member Sign Up page. 3. Enter your [employer] Access Code exactly as it appears below. You will not need to use this code after youve completed the sign-up process. If you do not sign up before the expiration date, you must request a new code. · [employer] Access Code: [email or web address] Expires: 9/25/2018 10:42 AM 
 
4. Enter the last four digits of your Social Security Number (xxxx) and Date of Birth (mm/dd/yyyy) as indicated and click Submit. You will be taken to the next sign-up page. 5. Create a [employer] ID. This will be your [employer] login ID and cannot be changed, so think of one that is secure and easy to remember. 6. Create a [employer] password. You can change your password at any time. 7. Enter your Password Reset Question and Answer. This can be used at a later time if you forget your password. 8. Enter your e-mail address. You will receive e-mail notification when new information is available in [address] 9. Click Sign Up. You can now view and download portions of your medical record. 10. Click the Download Summary menu link to download a portable copy of your medical information. If you have questions, please visit the Frequently Asked Questions section of the [employer] website. Remember, [employer] is NOT to be used for urgent needs. For medical emergencies, dial 911. Now available from your iPhone and Android! Please provide this summary of care documentation to your next provider. Your primary care clinician is listed as BOBBI HAYS. If you have any questions after today's visit, please call [phone].

## 2018-06-27 NOTE — PATIENT INSTRUCTIONS
Impetigo: Care Instructions  Your Care Instructions  Impetigo (say \"ba-gwa-PX-go\") is a skin infection caused by bacteria. It causes blisters that break and become oozing, yellow, crusty sores. Impetigo can be anywhere on the body. Scratching the sores may spread the infection to other parts of the body. You can also spread it to others through close contact or when you share towels, clothing, and other items. Prescription antibiotic ointment or pills can usually cure impetigo. (After a day of antibiotics, the infection should not spread.)  Follow-up care is a key part of your treatment and safety. Be sure to make and go to all appointments, and call your doctor if you are having problems. It's also a good idea to know your test results and keep a list of the medicines you take. How can you care for yourself at home? · Apply antibiotic ointment exactly as instructed. · If your doctor prescribed antibiotic pills, take them as directed. Do not stop using them just because you feel better. You need to take the full course of antibiotics. · Gently wash the sores with soap and water each day. If crusts form, your doctor may advise you to soften or remove the crusts. You can do this by soaking them in warm water and patting them dry. This can help the cream or ointment treat impetigo. · After you touch the area, wash your hands with soap and water. Or you can use an alcohol-based hand . · Don't share items such as towels, sheets, and clothing until the infection is gone. · Wash anything that may have touched the infected area. · Try to avoid scratching the area. When should you call for help? Call your doctor now or seek immediate medical care if:  ? · You have symptoms of a worse infection, such as:  ¨ Increased pain, swelling, warmth, or redness. ¨ Red streaks leading from the area. ¨ Pus draining from the area. ¨ A fever. ? · Impetigo gets worse or spreads to other areas. ? Watch closely for changes in your health, and be sure to contact your doctor if:  ? · You do not get better as expected. Where can you learn more? Go to http://che-meli.info/. Enter F209 in the search box to learn more about \"Impetigo: Care Instructions. \"  Current as of: May 12, 2017  Content Version: 11.4  © 1840-1477 [employer]. Care instructions adapted under license by [employer] (which disclaims liability or warranty for this information). If you have questions about a medical condition or this instruction, always ask your healthcare professional. [place] [address] any warranty or liability for your use of this information.

## 2018-06-27 NOTE — PROGRESS NOTES
Chief Complaint   Patient presents with    Lip Swelling     1. Have you been to the ER, urgent care clinic since your last visit? Hospitalized since your last visit? No    2. Have you seen or consulted any other health care providers outside of the [address] [place] [address] since your last visit? Include any pap smears or colon screening.  No

## 2018-06-29 ENCOUNTER — TELEPHONE (OUTPATIENT)
Dept: FAMILY MEDICINE CLINIC | Age: 68
End: 2018-06-29

## 2018-06-29 NOTE — TELEPHONE ENCOUNTER
Spoke to patient. I let her know that dr Cesar Davila is in patient care. She told me that she was worried about starting the medication Keflex. Dr. Cornelius Clifford came out of the room. I let him know what the patient had said. He said for patient to start the medication so she can feel better. If she does not feel better by mid-next week to call back. Pt was given message. She said she will go pick it up. Pt expressed clear understanding and had no further questions.

## 2018-06-29 NOTE — TELEPHONE ENCOUNTER
Patient called and states she has questions about the medication that was prescribed for her at her recent visit. She wouldn't give me any more info and sates she wants to speak with Dr. Yadiel Paez directly. I let her know that he is in patient care but I can send a message back through the nurse and the doctor. She states she can be reached at [phone].

## 2018-07-02 ENCOUNTER — OFFICE VISIT (OUTPATIENT)
Dept: FAMILY MEDICINE CLINIC | Age: 68
End: 2018-07-02

## 2018-07-02 VITALS
BODY MASS INDEX: 31.5 KG/M2 | WEIGHT: 196 LBS | HEIGHT: 66 IN | DIASTOLIC BLOOD PRESSURE: 84 MMHG | OXYGEN SATURATION: 98 % | RESPIRATION RATE: 15 BRPM | SYSTOLIC BLOOD PRESSURE: 144 MMHG | HEART RATE: 94 BPM

## 2018-07-02 DIAGNOSIS — L01.00 IMPETIGO: Primary | ICD-10-CM

## 2018-07-02 RX ORDER — ERGOCALCIFEROL 1.25 MG/1
CAPSULE ORAL
COMMUNITY
Start: 2018-04-24

## 2018-07-02 NOTE — PROGRESS NOTES
Chief Complaint   Patient presents with    Rash     pt wants dr Randee Leyva to reevaulate impetigo on face. Has not started keflex. 1. Have you been to the ER, urgent care clinic since your last visit? Hospitalized since your last visit? No    2. Have you seen or consulted any other health care providers outside of the [address] [place] [address] since your last visit? Include any pap smears or colon screening.  No

## 2018-07-02 NOTE — MR AVS SNAPSHOT
[facility] [address] [place] [address] Road 
[phone] Patient: Nikki Pacheco MRN: [id number] PNP:[id number] Visit Information Date & Time Provider Department Dept. Phone Encounter #  
 7/2/2018 11:45 AM Leslie Dale MD [facility] [phone] [id number] Follow-up Instructions Return if symptoms worsen or fail to improve. Upcoming Health Maintenance Date Due FOBT Q 1 YEAR AGE 50-75 12/3/2002 BREAST CANCER SCRN MAMMOGRAM 3/13/2017 GLAUCOMA SCREENING Q2Y 12/3/2017 Bone Densitometry (Dexa) Screening 12/3/2017 MEDICARE YEARLY EXAM 5/27/2018 Influenza Age 5 to Adult 8/1/2018 Pneumococcal 65+ Low/Medium Risk (2 of 2 - PPSV23) 12/15/2018 DTaP/Tdap/Td series (2 - Td) 12/15/2027 Allergies as of 7/2/2018  Review Complete On: 7/2/2018 By: Gail Forte LPN Severity Noted Reaction Type Reactions Codeine  11/15/2016    Other (comments)  
 hallucinations Oxycodone  09/20/2017    Unknown (comments) Current Immunizations  Never Reviewed No immunizations on file. Not reviewed this visit You Were Diagnosed With   
  
 Codes Comments Impetigo    -  Primary ICD-10-CM: L01.00 ICD-9-CM: 012 Vitals BP Pulse Resp Height(growth percentile) Weight(growth percentile) SpO2  
 144/84 94 15 5' 6\" (1.676 m) 196 lb (88.9 kg) 98% BMI OB Status Smoking Status 31.64 kg/m2 Hysterectomy Never Smoker BMI and BSA Data Body Mass Index Body Surface Area  
 31.64 kg/m 2 2.03 m 2 Preferred Pharmacy Pharmacy Name Phone [facility] [phone] [phone] Your Updated Medication List  
  
   
This list is accurate as of 7/2/18 11:55 AM.  Always use your most recent med list.  
  
  
  
  
 ALPHAGAN P 0.15 % ophthalmic solution Generic drug:  brimonidine cephALEXin 500 mg capsule Commonly known as:  Cathryn Alejandro Take 1 Cap by mouth four (4) times daily for 10 days. dorzolamide 2 % ophthalmic solution Commonly known as:  TRUSOPT  
  
 ergocalciferol 50,000 unit capsule Commonly known as:  ERGOCALCIFEROL  
  
 multivitamin tablet Commonly known as:  ONE A DAY Take 1 Tab by mouth daily. NIFEdipine ER 60 mg ER tablet Commonly known as:  ADALAT CC Take 60 mg by mouth daily. REFRESH CELLUVISC 1 % ophthalmic solution Generic drug:  carboxymethylcellulose sodium  
as needed. venlafaxine-SR 37.5 mg capsule Commonly known as:  EFFEXOR-XR Follow-up Instructions Return if symptoms worsen or fail to improve. Patient Instructions Impetigo: Care Instructions Your Care Instructions Impetigo (say \"ox-fuf-CF-go\") is a skin infection caused by bacteria. It causes blisters that break and become oozing, yellow, crusty sores. Impetigo can be anywhere on the body. Scratching the sores may spread the infection to other parts of the body. You can also spread it to others through close contact or when you share towels, clothing, and other items. Prescription antibiotic ointment or pills can usually cure impetigo. (After a day of antibiotics, the infection should not spread.) Follow-up care is a key part of your treatment and safety. Be sure to make and go to all appointments, and call your doctor if you are having problems. It's also a good idea to know your test results and keep a list of the medicines you take. How can you care for yourself at home? · Apply antibiotic ointment exactly as instructed. · If your doctor prescribed antibiotic pills, take them as directed. Do not stop using them just because you feel better. You need to take the full course of antibiotics. · Gently wash the sores with soap and water each day.  If crusts form, your doctor may advise you to soften or remove the crusts. You can do this by soaking them in warm water and patting them dry. This can help the cream or ointment treat impetigo. · After you touch the area, wash your hands with soap and water. Or you can use an alcohol-based hand . · Don't share items such as towels, sheets, and clothing until the infection is gone. · Wash anything that may have touched the infected area. · Try to avoid scratching the area. When should you call for help? Call your doctor now or seek immediate medical care if: 
? · You have symptoms of a worse infection, such as: 
¨ Increased pain, swelling, warmth, or redness. ¨ Red streaks leading from the area. ¨ Pus draining from the area. ¨ A fever. ? · Impetigo gets worse or spreads to other areas. ? Watch closely for changes in your health, and be sure to contact your doctor if: 
? · You do not get better as expected. Where can you learn more? Go to http://che-meli.info/. Enter R379 in the search box to learn more about \"Impetigo: Care Instructions. \" Current as of: May 12, 2017 Content Version: 11.4 © 2445-3957 [employer]. Care instructions adapted under license by [employer] (which disclaims liability or warranty for this information). If you have questions about a medical condition or this instruction, always ask your healthcare professional. [place] [address] any warranty or liability for your use of this information. Introducing [facility] & HEALTH SERVICES! Adi Stapleton introduces Panjo patient portal. Now you can access parts of your medical record, email your doctor's office, and request medication refills online. 1. In your internet browser, go to [email or web address] 2. Click on the First Time User? Click Here link in the Sign In box. You will see the New Member Sign Up page. 3. Enter your [employer] Access Code exactly as it appears below. You will not need to use this code after youve completed the sign-up process. If you do not sign up before the expiration date, you must request a new code. · [employer] Access Code: [email or web address] Expires: 9/25/2018 10:42 AM 
 
4. Enter the last four digits of your Social Security Number (xxxx) and Date of Birth (mm/dd/yyyy) as indicated and click Submit. You will be taken to the next sign-up page. 5. Create a B[employer] Well[employer] ID. This will be your [employer] login ID and cannot be changed, so think of one that is secure and easy to remember. 6. Create a [employer] password. You can change your password at any time. 7. Enter your Password Reset Question and Answer. This can be used at a later time if you forget your password. 8. Enter your e-mail address. You will receive e-mail notification when new information is available in [address] 9. Click Sign Up. You can now view and download portions of your medical record. 10. Click the Download Summary menu link to download a portable copy of your medical information. If you have questions, please visit the Frequently Asked Questions section of the [employer] website. Remember, [employer] is NOT to be used for urgent needs. For medical emergencies, dial 911. Now available from your iPhone and Android! Please provide this summary of care documentation to your next provider. Your primary care clinician is listed as BOBBI HAYS. If you have any questions after today's visit, please call [phone].

## 2018-07-02 NOTE — PROGRESS NOTES
Monique Zhao is a 74 y.o. female  presents for follow up. She has not started meds for rash on lips. She has itching crusted areas on upper lip. No fever or chills. Allergies   Allergen Reactions    Codeine Other (comments)     hallucinations    Oxycodone Unknown (comments)     Outpatient Prescriptions Marked as Taking for the 7/2/18 encounter (Office Visit) with Cat Taylor MD   Medication Sig Dispense Refill    venlafaxine-SR [facility]..) 37.5 mg capsule       NIFEdipine ER (ADALAT CC) 60 mg ER tablet Take 60 mg by mouth daily.  ALPHAGAN P 0.15 % ophthalmic solution       multivitamin (ONE A DAY) tablet Take 1 Tab by mouth daily.  REFRESH CELLUVISC 1 % ophthalmic solution as needed.  dorzolamide (TRUSOPT) 2 % ophthalmic solution        Patient Active Problem List   Diagnosis Code    Essential hypertension I10    ACP (advance care planning) Z71.89    Cochlear implant in place Z96.21    Class 1 obesity due to excess calories without serious comorbidity with body mass index (BMI) of 31.0 to 31.9 in adult E66.09, Z68.31     Past Medical History:   Diagnosis Date    Communication problem     due to severe hearing loss    Dizziness     Glaucoma     Hearing loss of both ears     left greater than right    High blood pressure     Menopause     Migraine     Unsteady gait      Social History     Social History    Marital status:      Spouse name: N/A    Number of children: N/A    Years of education: N/A     Social History Main Topics    Smoking status: Never Smoker    Smokeless tobacco: Never Used    Alcohol use No    Drug use: No    Sexual activity: Yes     Other Topics Concern    None     Social History Narrative     Family History   Problem Relation Age of Onset    Breast Cancer Mother     Diabetes Mother     Breast Cancer Other     Kidney Disease Brother         Review of Systems   Constitutional: Negative for chills and fever.    Cardiovascular: Negative for chest pain and palpitations. Gastrointestinal: Negative for nausea and vomiting. Skin: Positive for itching and rash. Psychiatric/Behavioral: Negative. Vitals:    07/02/18 1150   BP: 144/84   Pulse: 94   Resp: 15   SpO2: 98%   Weight: 196 lb (88.9 kg)   Height: 5' 6\" (1.676 m)   PainSc:   0 - No pain       Physical Exam   Constitutional: She is well-developed, well-nourished, and in no distress. Cardiovascular: Normal rate and regular rhythm. Pulmonary/Chest: Effort normal and breath sounds normal.   Neurological: She is alert. Gait normal.   Skin:   Cracking area on upper lip B. Some crusting. Nursing note and vitals reviewed. Assessment/Plan      ICD-10-CM ICD-9-CM    1. Impetigo L01.00 684      Will start Rx written last visit    I have discussed the diagnosis with the patient and the intended plan of care as seen in the above orders. The patient has received an after-visit summary and questions were answered concerning future plans. I have discussed medication, side effects, and warnings with the patient in detail. The patient verbalized understanding and is in agreement with the plan of care. The patient will contact the office with any additional concerns.     Follow-up Disposition:  Return if symptoms worsen or fail to improve.  lab results and schedule of future lab studies reviewed with patient    Senthil Lopez MD

## 2018-07-02 NOTE — PATIENT INSTRUCTIONS
Impetigo: Care Instructions  Your Care Instructions  Impetigo (say \"ww-kca-ZD-go\") is a skin infection caused by bacteria. It causes blisters that break and become oozing, yellow, crusty sores. Impetigo can be anywhere on the body. Scratching the sores may spread the infection to other parts of the body. You can also spread it to others through close contact or when you share towels, clothing, and other items. Prescription antibiotic ointment or pills can usually cure impetigo. (After a day of antibiotics, the infection should not spread.)  Follow-up care is a key part of your treatment and safety. Be sure to make and go to all appointments, and call your doctor if you are having problems. It's also a good idea to know your test results and keep a list of the medicines you take. How can you care for yourself at home? · Apply antibiotic ointment exactly as instructed. · If your doctor prescribed antibiotic pills, take them as directed. Do not stop using them just because you feel better. You need to take the full course of antibiotics. · Gently wash the sores with soap and water each day. If crusts form, your doctor may advise you to soften or remove the crusts. You can do this by soaking them in warm water and patting them dry. This can help the cream or ointment treat impetigo. · After you touch the area, wash your hands with soap and water. Or you can use an alcohol-based hand . · Don't share items such as towels, sheets, and clothing until the infection is gone. · Wash anything that may have touched the infected area. · Try to avoid scratching the area. When should you call for help? Call your doctor now or seek immediate medical care if:  ? · You have symptoms of a worse infection, such as:  ¨ Increased pain, swelling, warmth, or redness. ¨ Red streaks leading from the area. ¨ Pus draining from the area. ¨ A fever. ? · Impetigo gets worse or spreads to other areas. ? Watch closely for changes in your health, and be sure to contact your doctor if:  ? · You do not get better as expected. Where can you learn more? Go to http://che-meli.info/. Enter G802 in the search box to learn more about \"Impetigo: Care Instructions. \"  Current as of: May 12, 2017  Content Version: 11.4  © 3780-1898 [employer]. Care instructions adapted under license by [employer] (which disclaims liability or warranty for this information). If you have questions about a medical condition or this instruction, always ask your healthcare professional. [address] any warranty or liability for your use of this information.

## 2018-07-11 ENCOUNTER — OFFICE VISIT (OUTPATIENT)
Dept: FAMILY MEDICINE CLINIC | Age: 68
End: 2018-07-11

## 2018-07-11 DIAGNOSIS — L98.9 SKIN LESION: Primary | ICD-10-CM

## 2018-07-11 NOTE — MR AVS SNAPSHOT
[facility] [address] [place] [address] 
[phone] Patient: Galdino Lainez MRN: [id number] OZH:[birth year] Visit Information Date & Time Provider Department Dept. Phone Encounter #  
 7/11/2018  3:45 PM Nydia Silva MD [facility] [phone] [id number] Follow-up Instructions Return if symptoms worsen or fail to improve. Upcoming Health Maintenance Date Due FOBT Q 1 YEAR AGE 50-75 12/3/2002 BREAST CANCER SCRN MAMMOGRAM 3/13/2017 GLAUCOMA SCREENING Q2Y 12/3/2017 Bone Densitometry (Dexa) Screening 12/3/2017 MEDICARE YEARLY EXAM 5/27/2018 Influenza Age 5 to Adult 8/1/2018 Pneumococcal 65+ Low/Medium Risk (2 of 2 - PPSV23) 12/15/2018 DTaP/Tdap/Td series (2 - Td) 12/15/2027 Allergies as of 7/11/2018  Review Complete On: 7/11/2018 By: Nydia Silva MD  
  
 Severity Noted Reaction Type Reactions Codeine  11/15/2016    Other (comments)  
 hallucinations Oxycodone  09/20/2017    Unknown (comments) Current Immunizations  Never Reviewed No immunizations on file. Not reviewed this visit You Were Diagnosed With   
  
 Codes Comments Skin lesion    -  Primary ICD-10-CM: L98.9 ICD-9-CM: 709.9 Vitals OB Status Smoking Status Hysterectomy Never Smoker Preferred Pharmacy Pharmacy Name Phone Atul Reyes, Paulina [facility] [phone], [address] [phone] Your Updated Medication List  
  
   
This list is accurate as of 7/11/18  4:04 PM.  Always use your most recent med list.  
  
  
  
  
 ALPHAGAN P 0.15 % ophthalmic solution Generic drug:  brimonidine  
  
 dorzolamide 2 % ophthalmic solution Commonly known as:  TRUSOPT  
  
 ergocalciferol 50,000 unit capsule Commonly known as:  ERGOCALCIFEROL  
  
 multivitamin tablet Commonly known as:  ONE A DAY Take 1 Tab by mouth daily. NIFEdipine ER 60 mg ER tablet Commonly known as:  ADALAT CC Take 60 mg by mouth daily. REFRESH CELLUVISC 1 % ophthalmic solution Generic drug:  carboxymethylcellulose sodium  
as needed. venlafaxine-SR 37.5 mg capsule Commonly known as:  EFFEXOR-XR We Performed the Following REFERRAL TO DERMATOLOGY [REF19 Custom] Comments:  
 Please evaluate patient for skin lesion. Follow-up Instructions Return if symptoms worsen or fail to improve. Referral Information Referral ID Referred By Referred To  
  
 [id number] Veena Solorio MD   
   [address] [place] [address] [facility] [place], [address] [place] [address] Phone: [phone] Fax: [phone] Visits Status Start Date End Date 1 New Request 7/11/18 7/11/19 If your referral has a status of pending review or denied, additional information will be sent to support the outcome of this decision. Patient Instructions Antibiotics for Skin Conditions: Care Instructions Your Care Instructions Antibiotics are medicines that kill bacteria. Bacteria can cause some skin problems or conditions, such as impetigo. They can also lead to problems like acne. There are many types of antibiotics. Each works a little differently and acts on different types of bacteria. Your doctor will decide which medicine will work best for you. You can put an antibiotic ointment or cream on your skin. Or you can take pills by mouth to kill bacteria in your skin pores. Antibiotics are not used to treat skin problems that are caused by viruses or allergies. But sometimes bacteria get into a skin problem you already have. Then you may need this medicine. Follow-up care is a key part of your treatment and safety. Be sure to make and go to all appointments, and call your doctor if you are having problems. It's also a good idea to know your test results and keep a list of the medicines you take. How can you care for yourself at home? To take antibiotics · If your doctor prescribed pills, take them as directed. Do not stop taking them just because your skin problem gets better. You need to take the full course of antibiotics. · Apply antibiotic ointment exactly as instructed. · Read the label to learn how to store your medicine. · Do not use antibiotics that were prescribed for a different illness or for someone else. You may take longer to heal. And your skin problem may get worse. To take care of your skin · Try not to scratch rashes or sores. Scratching may spread bacteria to other parts of your skin or body. · Clean your skin with mild soap and water 2 times a day unless your doctor gives you different instructions. Don't use hydrogen peroxide or alcohol, which can slow healing. · Protect your skin from the sun. Wear hats with wide brims, sunglasses, and loose-fitting, tightly woven clothing that covers your arms and legs. Make sure to use a broad-spectrum sunscreen that has a sun protection factor (SPF) of 30 or higher. Apply it several times a day. What are the possible side effects? Many people do not have side effects from antibiotics. But sometimes people have problems, such as: 
· Nausea, diarrhea, and belly pain. · Allergic reactions, such as a skin rash. · Vaginal yeast infections. If the side effects bother you, ask your doctor if there is another antibiotic that will work as well but will not cause these effects. When should you call for help? Call your doctor now or seek immediate medical care if: 
  · You have signs of an infection, such as: 
¨ Increased pain, swelling, warmth, and redness. ¨ Red streaks leading from the affected area. ¨ Pus draining from the area. ¨ A fever.  
 Watch closely for changes in your health, and be sure to contact your doctor if: 
  · You think you may be having a problem with the medicine.  
  · You do not get better as expected. Where can you learn more? Go to http://che-meli.info/. Enter V152 in the search box to learn more about \"Antibiotics for Skin Conditions: Care Instructions. \" Current as of: October 5, 2017 Content Version: 11.7 © 0210-0315 [employer]. Care instructions adapted under license by Where (which disclaims liability or warranty for this information). If you have questions about a medical condition or this instruction, always ask your healthcare professional. [address] any warranty or liability for your use of this information. Introducing [facility]! Kia Loving introduces HuStream patient portal. Now you can access parts of your medical record, email your doctor's office, and request medication refills online. 1. In your internet browser, go to [email or web address] 2. Click on the First Time User? Click Here link in the Sign In box. You will see the New Member Sign Up page. 3. Enter your HuStream Access Code exactly as it appears below. You will not need to use this code after youve completed the sign-up process. If you do not sign up before the expiration date, you must request a new code. · HuStream Access Code: [email or web address] Expires: 9/25/2018 10:42 AM 
 
4. Enter the last four digits of your Social Security Number (xxxx) and Date of Birth (mm/dd/yyyy) as indicated and click Submit. You will be taken to the next sign-up page. 5. Create a HuStream ID. This will be your HuStream login ID and cannot be changed, so think of one that is secure and easy to remember. 6. Create a HuStream password. You can change your password at any time. 7. Enter your Password Reset Question and Answer. This can be used at a later time if you forget your password. 8. Enter your e-mail address. You will receive e-mail notification when new information is available in [address] 9. Click Sign Up. You can now view and download portions of your medical record. 10. Click the Download Summary menu link to download a portable copy of your medical information. If you have questions, please visit the Frequently Asked Questions section of the SourceTour website. Remember, SourceTour is NOT to be used for urgent needs. For medical emergencies, dial 911. Now available from your iPhone and Android! Please provide this summary of care documentation to your next provider. Your primary care clinician is listed as [facility]. If you have any questions after today's visit, please call [phone].

## 2018-07-11 NOTE — PROGRESS NOTES
Luane Dakins is a 74 y.o. female  presents for skin lesion. They are not getting better. Allergies   Allergen Reactions    Codeine Other (comments)     hallucinations    Oxycodone Unknown (comments)     Outpatient Prescriptions Marked as Taking for the 7/11/18 encounter (Office Visit) with China Nuñez MD   Medication Sig Dispense Refill    ergocalciferol (ERGOCALCIFEROL) 50,000 unit capsule       venlafaxine-SR (EFFEXOR-XR) 37.5 mg capsule       NIFEdipine ER (ADALAT CC) 60 mg ER tablet Take 60 mg by mouth daily.  ALPHAGAN P 0.15 % ophthalmic solution       multivitamin (ONE A DAY) tablet Take 1 Tab by mouth daily.  REFRESH CELLUVISC 1 % ophthalmic solution as needed.  dorzolamide (TRUSOPT) 2 % ophthalmic solution        Patient Active Problem List   Diagnosis Code    Essential hypertension I10    ACP (advance care planning) Z71.89    Cochlear implant in place Z96.21    Class 1 obesity due to excess calories without serious comorbidity with body mass index (BMI) of 31.0 to 31.9 in adult E66.09, Z68.31     Past Medical History:   Diagnosis Date    Communication problem     due to severe hearing loss    Dizziness     Glaucoma     Hearing loss of both ears     left greater than right    High blood pressure     Menopause     Migraine     Unsteady gait      Social History     Social History    Marital status:      Spouse name: N/A    Number of children: N/A    Years of education: N/A     Social History Main Topics    Smoking status: Never Smoker    Smokeless tobacco: Never Used    Alcohol use No    Drug use: No    Sexual activity: Yes     Other Topics Concern    Not on file     Social History Narrative     Family History   Problem Relation Age of Onset    Breast Cancer Mother     Diabetes Mother     Breast Cancer Other     Kidney Disease Brother         Review of Systems   Constitutional: Negative for chills and fever.    Cardiovascular: Negative for chest pain and palpitations. Skin: Positive for itching and rash. Psychiatric/Behavioral: Negative. There were no vitals filed for this visit. Physical Exam   Constitutional: She is oriented to person, place, and time and well-developed, well-nourished, and in no distress. Cardiovascular: Normal rate, regular rhythm and normal heart sounds. Pulmonary/Chest: Effort normal and breath sounds normal.   Neurological: She is alert and oriented to person, place, and time. Skin: Skin is warm and dry. Rash noted. Nursing note and vitals reviewed. Assessment/Plan      ICD-10-CM ICD-9-CM    1. Skin lesion L98.9 709.9 REFERRAL TO DERMATOLOGY     I have discussed the diagnosis with the patient and the intended plan of care as seen in the above orders. The patient has received an after-visit summary and questions were answered concerning future plans. I have discussed medication, side effects, and warnings with the patient in detail. The patient verbalized understanding and is in agreement with the plan of care. The patient will contact the office with any additional concerns.       Follow-up Disposition:  Return if symptoms worsen or fail to improve.  lab results and schedule of future lab studies reviewed with patient    Ishmael Nickerson MD

## 2018-07-11 NOTE — PROGRESS NOTES
Chief Complaint   Patient presents with    Rash     pt in office for rash follow up     1. Have you been to the ER, urgent care clinic since your last visit? Hospitalized since your last visit? No    2. Have you seen or consulted any other health care providers outside of the [facility] since your last visit? Include any pap smears or colon screening.  No

## 2018-07-11 NOTE — PATIENT INSTRUCTIONS
Antibiotics for Skin Conditions: Care Instructions Your Care Instructions Antibiotics are medicines that kill bacteria. Bacteria can cause some skin problems or conditions, such as impetigo. They can also lead to problems like acne. There are many types of antibiotics. Each works a little differently and acts on different types of bacteria. Your doctor will decide which medicine will work best for you. You can put an antibiotic ointment or cream on your skin. Or you can take pills by mouth to kill bacteria in your skin pores. Antibiotics are not used to treat skin problems that are caused by viruses or allergies. But sometimes bacteria get into a skin problem you already have. Then you may need this medicine. Follow-up care is a key part of your treatment and safety. Be sure to make and go to all appointments, and call your doctor if you are having problems. It's also a good idea to know your test results and keep a list of the medicines you take. How can you care for yourself at home? To take antibiotics · If your doctor prescribed pills, take them as directed. Do not stop taking them just because your skin problem gets better. You need to take the full course of antibiotics. · Apply antibiotic ointment exactly as instructed. · Read the label to learn how to store your medicine. · Do not use antibiotics that were prescribed for a different illness or for someone else. You may take longer to heal. And your skin problem may get worse. To take care of your skin · Try not to scratch rashes or sores. Scratching may spread bacteria to other parts of your skin or body. · Clean your skin with mild soap and water 2 times a day unless your doctor gives you different instructions. Don't use hydrogen peroxide or alcohol, which can slow healing. · Protect your skin from the sun. Wear hats with wide brims, sunglasses, and loose-fitting, tightly woven clothing that covers your arms and legs.  Make sure to use a broad-spectrum sunscreen that has a sun protection factor (SPF) of 30 or higher. Apply it several times a day. What are the possible side effects? Many people do not have side effects from antibiotics. But sometimes people have problems, such as: 
· Nausea, diarrhea, and belly pain. · Allergic reactions, such as a skin rash. · Vaginal yeast infections. If the side effects bother you, ask your doctor if there is another antibiotic that will work as well but will not cause these effects. When should you call for help? Call your doctor now or seek immediate medical care if: 
  · You have signs of an infection, such as: 
¨ Increased pain, swelling, warmth, and redness. ¨ Red streaks leading from the affected area. ¨ Pus draining from the area. ¨ A fever.  
 Watch closely for changes in your health, and be sure to contact your doctor if: 
  · You think you may be having a problem with the medicine.  
  · You do not get better as expected. Where can you learn more? Go to http://che-meli.info/. Enter E318 in the search box to learn more about \"Antibiotics for Skin Conditions: Care Instructions. \" Current as of: October 5, 2017 Content Version: 11.7 © 9730-2036 [employer]. Care instructions adapted under license by [employer] (which disclaims liability or warranty for this information). If you have questions about a medical condition or this instruction, always ask your healthcare professional. [place] [address] any warranty or liability for your use of this information.

## 2018-11-16 ENCOUNTER — OFFICE VISIT (OUTPATIENT)
Dept: FAMILY MEDICINE CLINIC | Age: 68
End: 2018-11-16

## 2018-11-16 VITALS
OXYGEN SATURATION: 99 % | WEIGHT: 232 LBS | TEMPERATURE: 98.8 F | DIASTOLIC BLOOD PRESSURE: 82 MMHG | HEIGHT: 66 IN | BODY MASS INDEX: 37.28 KG/M2 | HEART RATE: 90 BPM | RESPIRATION RATE: 15 BRPM | SYSTOLIC BLOOD PRESSURE: 150 MMHG

## 2018-11-16 DIAGNOSIS — M25.571 ACUTE RIGHT ANKLE PAIN: ICD-10-CM

## 2018-11-16 DIAGNOSIS — Z23 ENCOUNTER FOR IMMUNIZATION: ICD-10-CM

## 2018-11-16 DIAGNOSIS — I10 ESSENTIAL HYPERTENSION: Primary | ICD-10-CM

## 2018-11-16 PROBLEM — E66.01 SEVERE OBESITY (HCC): Status: ACTIVE | Noted: 2018-11-16

## 2018-11-16 NOTE — PROGRESS NOTES
Chief Complaint Patient presents with  Back Pain  Shoulder Pain 1. Have you been to the ER, urgent care clinic since your last visit? Hospitalized since your last visit? No 
 
2. Have you seen or consulted any other health care providers outside of the [address] [place] [address] since your last visit? Include any pap smears or colon screening.  No

## 2018-11-16 NOTE — PROGRESS NOTES
Pamela Carrera is a 74 y.o. female  presents for follow up. Has pain in her right ankle. sxs are getting worse. No history or injury. No weakness or numbness. Allergies Allergen Reactions  Codeine Other (comments)  
  hallucinations  Oxycodone Unknown (comments) Outpatient Medications Marked as Taking for the 11/16/18 encounter (Office Visit) with Shayan Srinivasan MD  
Medication Sig Dispense Refill  ergocalciferol (ERGOCALCIFEROL) 50,000 unit capsule  venlafaxine-SR (EFFEXOR-XR) 37.5 mg capsule  NIFEdipine ER (ADALAT CC) 60 mg ER tablet Take 60 mg by mouth daily.  ALPHAGAN P 0.15 % ophthalmic solution  multivitamin (ONE A DAY) tablet Take 1 Tab by mouth daily.  REFRESH CELLUVISC 1 % ophthalmic solution as needed.  dorzolamide (TRUSOPT) 2 % ophthalmic solution Patient Active Problem List  
Diagnosis Code  Essential hypertension I10  
 ACP (advance care planning) Z71.89  
 Cochlear implant in place Z96.21  
 Class 1 obesity due to excess calories without serious comorbidity with body mass index (BMI) of 31.0 to 31.9 in adult E66.09, Z68.31  Severe obesity (HCC) E66.01 Past Medical History:  
Diagnosis Date  Communication problem   
 due to severe hearing loss  Dizziness  Glaucoma  Hearing loss of both ears   
 left greater than right  High blood pressure  Menopause  Migraine  Unsteady gait Social History Socioeconomic History  Marital status:  Spouse name: Not on file  Number of children: Not on file  Years of education: Not on file  Highest education level: Not on file Social Needs  Financial resource strain: Not on file  Food insecurity - worry: Not on file  Food insecurity - inability: Not on file  Transportation needs - medical: Not on file  Transportation needs - non-medical: Not on file Occupational History  Not on file Tobacco Use  
  Smoking status: Never Smoker  Smokeless tobacco: Never Used Substance and Sexual Activity  Alcohol use: No  
 Drug use: No  
 Sexual activity: Yes Other Topics Concern  Not on file Social History Narrative  Not on file Family History Problem Relation Age of Onset  Breast Cancer Mother  Diabetes Mother  Breast Cancer Other  Kidney Disease Brother Review of Systems Constitutional: Negative for chills, fever, malaise/fatigue and weight loss. Eyes: Negative for blurred vision. Respiratory: Negative for shortness of breath and wheezing. Cardiovascular: Negative for chest pain. Gastrointestinal: Negative for nausea and vomiting. Musculoskeletal: Positive for joint pain (right ankle. ). Negative for myalgias. Skin: Negative for rash. Neurological: Negative for weakness. Psychiatric/Behavioral: Negative. Vitals:  
 11/16/18 1034 BP: 150/82 Pulse: 90 Resp: 15 Temp: 98.8 °F (37.1 °C) SpO2: 99% Weight: 232 lb (105.2 kg) Height: 5' 6\" (1.676 m) PainSc:   8 PainLoc: Back Physical Exam  
Constitutional: She is oriented to person, place, and time and well-developed, well-nourished, and in no distress. Neck: Normal range of motion. Neck supple. No thyromegaly present. Cardiovascular: Normal rate, regular rhythm and normal heart sounds. Pulmonary/Chest: Effort normal and breath sounds normal.  
Musculoskeletal: Normal range of motion. She exhibits edema and tenderness (right ankle. ). Neurological: She is alert and oriented to person, place, and time. Skin: Skin is warm and dry. Psychiatric: Mood, memory, affect and judgment normal.  
Nursing note and vitals reviewed. Assessment/Plan ICD-10-CM ICD-9-CM 1. Essential hypertension I10 401.9 2. Encounter for immunization Z23 V03.89 INFLUENZA VACCINE INACTIVATED (IIV), SUBUNIT, ADJUVANTED, IM  
3. Acute right ankle pain M25.571 719.47 XR ANKLE RT AP/LAT 338.19 I have discussed the diagnosis with the patient and the intended plan of care as seen in the above orders. The patient has received an after-visit summary and questions were answered concerning future plans. I have discussed medication, side effects, and warnings with the patient in detail. The patient verbalized understanding and is in agreement with the plan of care. The patient will contact the office with any additional concerns. Follow-up Disposition: 
Return in about 3 months (around 2/16/2019). lab results and schedule of future lab studies reviewed with patient Maude Gruber MD

## 2018-11-16 NOTE — PATIENT INSTRUCTIONS

## 2018-11-23 ENCOUNTER — OFFICE VISIT (OUTPATIENT)
Dept: FAMILY MEDICINE CLINIC | Age: 68
End: 2018-11-23

## 2018-11-23 VITALS
DIASTOLIC BLOOD PRESSURE: 82 MMHG | HEIGHT: 66 IN | BODY MASS INDEX: 37.28 KG/M2 | SYSTOLIC BLOOD PRESSURE: 148 MMHG | WEIGHT: 232 LBS | RESPIRATION RATE: 14 BRPM | OXYGEN SATURATION: 99 % | HEART RATE: 93 BPM

## 2018-11-23 DIAGNOSIS — M25.532 LEFT WRIST PAIN: Primary | ICD-10-CM

## 2018-11-23 RX ORDER — PREDNISONE 10 MG/1
TABLET ORAL
Qty: 21 TAB | Refills: 0 | Status: SHIPPED | OUTPATIENT
Start: 2018-11-23 | End: 2019-01-01

## 2018-11-23 NOTE — PATIENT INSTRUCTIONS

## 2018-11-23 NOTE — PROGRESS NOTES
Chief Complaint Patient presents with  Shoulder Pain Pt c/o left shoulder pain. Pt had fall back in Sept 18 and injured her shoulder. 1. Have you been to the ER, urgent care clinic since your last visit? Hospitalized since your last visit? No 
 
2. Have you seen or consulted any other health care providers outside of the [address] [place] [address] since your last visit? Include any pap smears or colon screening.  No

## 2018-11-23 NOTE — PROGRESS NOTES
Marty Treviño is a 74 y.o. female  presents for left upper ext pain. No injury or trauma. No weakness or numbness. She has had sxs for several weeks. sxs are getting worse. Allergies Allergen Reactions  Codeine Other (comments)  
  hallucinations  Oxycodone Unknown (comments) Patient Active Problem List  
Diagnosis Code  Essential hypertension I10  
 ACP (advance care planning) Z71.89  
 Cochlear implant in place Z96.21  
 Class 1 obesity due to excess calories without serious comorbidity with body mass index (BMI) of 31.0 to 31.9 in adult E66.09, Z68.31  Severe obesity (HCC) E66.01 Past Medical History:  
Diagnosis Date  Communication problem   
 due to severe hearing loss  Dizziness  Glaucoma  Hearing loss of both ears   
 left greater than right  High blood pressure  Menopause  Migraine  Unsteady gait Social History Socioeconomic History  Marital status:  Spouse name: Not on file  Number of children: Not on file  Years of education: Not on file  Highest education level: Not on file Tobacco Use  Smoking status: Never Smoker  Smokeless tobacco: Never Used Substance and Sexual Activity  Alcohol use: No  
 Drug use: No  
 Sexual activity: Yes Family History Problem Relation Age of Onset  Breast Cancer Mother  Diabetes Mother  Breast Cancer Other  Kidney Disease Brother Review of Systems Constitutional: Negative for chills, fever, malaise/fatigue and weight loss. Eyes: Negative for blurred vision. Respiratory: Negative for shortness of breath and wheezing. Cardiovascular: Negative for chest pain. Gastrointestinal: Negative for nausea and vomiting. Musculoskeletal: Negative for myalgias. Skin: Negative for rash. Neurological: Negative for weakness. Psychiatric/Behavioral: Negative. Vitals:  
 11/23/18 1124 BP: 148/82 Pulse: 93 Resp: 14  
 SpO2: 99% Weight: 232 lb (105.2 kg) Height: 5' 6\" (1.676 m) PainSc:  10 - Worst pain ever PainLoc: Shoulder Physical Exam  
Constitutional: She is oriented to person, place, and time and well-developed, well-nourished, and in no distress. Neck: Normal range of motion. Neck supple. No thyromegaly present. Cardiovascular: Normal rate, regular rhythm and normal heart sounds. Pulmonary/Chest: Effort normal and breath sounds normal.  
Musculoskeletal: Normal range of motion. Neurological: She is alert and oriented to person, place, and time. Skin: Skin is warm and dry. Psychiatric: Mood, memory, affect and judgment normal.  
Nursing note and vitals reviewed. Assessment/Plan ICD-10-CM ICD-9-CM 1. Left wrist pain M25.532 719.43 predniSONE (STERAPRED DS) 10 mg dose pack I have discussed the diagnosis with the patient and the intended plan of care as seen in the above orders. The patient has received an after-visit summary and questions were answered concerning future plans. I have discussed medication, side effects, and warnings with the patient in detail. The patient verbalized understanding and is in agreement with the plan of care. The patient will contact the office with any additional concerns. Follow-up Disposition: 
Return if symptoms worsen or fail to improve. 
lab results and schedule of future lab studies reviewed with patient Audie Stone MD

## 2018-12-17 NOTE — TELEPHONE ENCOUNTER
Patient called today requesting a refill on her Adalat 60Mg. No scheduled Appointment until 02/16/19.

## 2018-12-18 RX ORDER — NIFEDIPINE 60 MG/1
60 TABLET, EXTENDED RELEASE ORAL DAILY
Qty: 30 TAB | Refills: 2 | Status: SHIPPED | OUTPATIENT
Start: 2018-12-18 | End: 2019-01-07 | Stop reason: SDUPTHER

## 2019-01-01 ENCOUNTER — OFFICE VISIT (OUTPATIENT)
Dept: FAMILY MEDICINE CLINIC | Age: 69
End: 2019-01-01

## 2019-01-01 ENCOUNTER — TELEPHONE (OUTPATIENT)
Dept: FAMILY MEDICINE CLINIC | Age: 69
End: 2019-01-01

## 2019-01-01 VITALS
OXYGEN SATURATION: 98 % | HEART RATE: 87 BPM | HEIGHT: 66 IN | DIASTOLIC BLOOD PRESSURE: 98 MMHG | BODY MASS INDEX: 38.74 KG/M2 | SYSTOLIC BLOOD PRESSURE: 140 MMHG | TEMPERATURE: 98.5 F | RESPIRATION RATE: 12 BRPM

## 2019-01-01 VITALS
WEIGHT: 206 LBS | TEMPERATURE: 98.1 F | HEART RATE: 88 BPM | RESPIRATION RATE: 12 BRPM | SYSTOLIC BLOOD PRESSURE: 138 MMHG | BODY MASS INDEX: 33.11 KG/M2 | HEIGHT: 66 IN | OXYGEN SATURATION: 98 % | DIASTOLIC BLOOD PRESSURE: 96 MMHG

## 2019-01-01 VITALS
HEART RATE: 90 BPM | OXYGEN SATURATION: 95 % | DIASTOLIC BLOOD PRESSURE: 100 MMHG | WEIGHT: 211 LBS | BODY MASS INDEX: 33.91 KG/M2 | RESPIRATION RATE: 16 BRPM | TEMPERATURE: 98.1 F | HEIGHT: 66 IN | SYSTOLIC BLOOD PRESSURE: 146 MMHG

## 2019-01-01 VITALS
SYSTOLIC BLOOD PRESSURE: 140 MMHG | WEIGHT: 211 LBS | HEIGHT: 66 IN | HEART RATE: 69 BPM | DIASTOLIC BLOOD PRESSURE: 92 MMHG | OXYGEN SATURATION: 99 % | BODY MASS INDEX: 33.91 KG/M2 | RESPIRATION RATE: 12 BRPM

## 2019-01-01 DIAGNOSIS — I10 ESSENTIAL HYPERTENSION: Primary | ICD-10-CM

## 2019-01-01 DIAGNOSIS — L98.9 SKIN LESION: ICD-10-CM

## 2019-01-01 DIAGNOSIS — R60.0 LOCALIZED EDEMA: ICD-10-CM

## 2019-01-01 DIAGNOSIS — H65.01 NON-RECURRENT ACUTE SEROUS OTITIS MEDIA OF RIGHT EAR: Primary | ICD-10-CM

## 2019-01-01 DIAGNOSIS — Z71.89 ACP (ADVANCE CARE PLANNING): ICD-10-CM

## 2019-01-01 DIAGNOSIS — F01.518 VASCULAR DEMENTIA WITH BEHAVIOR DISTURBANCE: Primary | ICD-10-CM

## 2019-01-01 DIAGNOSIS — N28.9 RENAL INSUFFICIENCY: ICD-10-CM

## 2019-01-01 DIAGNOSIS — Z00.00 MEDICARE ANNUAL WELLNESS VISIT, SUBSEQUENT: ICD-10-CM

## 2019-01-01 DIAGNOSIS — F02.818 DEMENTIA ASSOCIATED WITH OTHER UNDERLYING DISEASE WITH BEHAVIORAL DISTURBANCE: Primary | ICD-10-CM

## 2019-01-01 DIAGNOSIS — F02.818 DEMENTIA ASSOCIATED WITH OTHER UNDERLYING DISEASE WITH BEHAVIORAL DISTURBANCE: ICD-10-CM

## 2019-01-01 RX ORDER — NIFEDIPINE 30 MG/1
TABLET, FILM COATED, EXTENDED RELEASE ORAL DAILY
COMMUNITY

## 2019-01-01 RX ORDER — CLONIDINE HYDROCHLORIDE 0.1 MG/1
TABLET ORAL
COMMUNITY
Start: 2019-01-01 | End: 2019-01-01 | Stop reason: SDUPTHER

## 2019-01-01 RX ORDER — AMOXICILLIN AND CLAVULANATE POTASSIUM 500; 125 MG/1; MG/1
1 TABLET, FILM COATED ORAL 2 TIMES DAILY
Qty: 20 TAB | Refills: 0 | Status: SHIPPED | OUTPATIENT
Start: 2019-01-01 | End: 2019-01-01

## 2019-01-01 RX ORDER — LATANOPROST 50 UG/ML
SOLUTION/ DROPS OPHTHALMIC
COMMUNITY
Start: 2019-01-01

## 2019-01-01 RX ORDER — CLONIDINE HYDROCHLORIDE 0.1 MG/1
0.1 TABLET ORAL 2 TIMES DAILY
Qty: 60 TAB | Refills: 2 | Status: SHIPPED | OUTPATIENT
Start: 2019-01-01

## 2019-01-01 RX ORDER — CLOBETASOL PROPIONATE 0.5 MG/G
EMULSION TOPICAL 2 TIMES DAILY
Qty: 30 G | Refills: 2 | Status: SHIPPED | OUTPATIENT
Start: 2019-01-01

## 2019-01-01 RX ORDER — TELMISARTAN 80 MG/1
80 TABLET ORAL DAILY
COMMUNITY

## 2019-01-01 RX ORDER — LISINOPRIL 40 MG/1
TABLET ORAL
COMMUNITY
Start: 2019-01-01

## 2019-01-01 RX ORDER — CLOBETASOL PROPIONATE 0.5 MG/G
OINTMENT TOPICAL
COMMUNITY
Start: 2019-01-01

## 2019-01-01 RX ORDER — AMLODIPINE BESYLATE 5 MG/1
TABLET ORAL
COMMUNITY
Start: 2019-01-01 | End: 2019-01-01 | Stop reason: ALTCHOICE

## 2019-01-01 RX ORDER — BETAXOLOL HYDROCHLORIDE 2.8 MG/ML
SUSPENSION/ DROPS OPHTHALMIC
COMMUNITY
Start: 2019-01-01

## 2019-01-01 RX ORDER — PREDNISOLONE ACETATE 10 MG/ML
SUSPENSION/ DROPS OPHTHALMIC
COMMUNITY
Start: 2019-01-01

## 2019-01-07 ENCOUNTER — OFFICE VISIT (OUTPATIENT)
Dept: FAMILY MEDICINE CLINIC | Age: 69
End: 2019-01-07

## 2019-01-07 VITALS
HEIGHT: 66 IN | TEMPERATURE: 97.9 F | DIASTOLIC BLOOD PRESSURE: 98 MMHG | SYSTOLIC BLOOD PRESSURE: 166 MMHG | HEART RATE: 79 BPM | BODY MASS INDEX: 36.96 KG/M2 | WEIGHT: 230 LBS | OXYGEN SATURATION: 96 % | RESPIRATION RATE: 10 BRPM

## 2019-01-07 DIAGNOSIS — I10 ESSENTIAL HYPERTENSION: Primary | ICD-10-CM

## 2019-01-07 RX ORDER — NIFEDIPINE 60 MG/1
60 TABLET, EXTENDED RELEASE ORAL DAILY
Qty: 90 TAB | Refills: 1 | Status: SHIPPED | OUTPATIENT
Start: 2019-01-07 | End: 2019-04-03 | Stop reason: SDUPTHER

## 2019-01-07 NOTE — PROGRESS NOTES
Patient states she ran out of her blood pressure medication about one week ago she was seen at [facility] who gave her a prescription for Adalat. She states the tablet looks different but says she was told it was made by a different manufacture. She states she passed out and was taken to [facility] ER. 1. Have you been to the ER, urgent care clinic since your last visit? Hospitalized since your last visit? Yes She says she was seen at [facility] ER 
2. Have you seen or consulted any other health care providers outside of the [address] [place] [address] since your last visit? Include any pap smears or colon screening. She was seen in [place] for an eye surgery

## 2019-01-07 NOTE — PATIENT INSTRUCTIONS
High Blood Pressure: Care Instructions Your Care Instructions If your blood pressure is usually above 130/80, you have high blood pressure, or hypertension. That means the top number is 130 or higher or the bottom number is 80 or higher, or both. Despite what a lot of people think, high blood pressure usually doesn't cause headaches or make you feel dizzy or lightheaded. It usually has no symptoms. But it does increase your risk for heart attack, stroke, and kidney or eye damage. The higher your blood pressure, the more your risk increases. Your doctor will give you a goal for your blood pressure. Your goal will be based on your health and your age. Lifestyle changes, such as eating healthy and being active, are always important to help lower blood pressure. You might also take medicine to reach your blood pressure goal. 
Follow-up care is a key part of your treatment and safety. Be sure to make and go to all appointments, and call your doctor if you are having problems. It's also a good idea to know your test results and keep a list of the medicines you take. How can you care for yourself at home? Medical treatment · If you stop taking your medicine, your blood pressure will go back up. You may take one or more types of medicine to lower your blood pressure. Be safe with medicines. Take your medicine exactly as prescribed. Call your doctor if you think you are having a problem with your medicine. · Talk to your doctor before you start taking aspirin every day. Aspirin can help certain people lower their risk of a heart attack or stroke. But taking aspirin isn't right for everyone, because it can cause serious bleeding. · See your doctor regularly. You may need to see the doctor more often at first or until your blood pressure comes down. · If you are taking blood pressure medicine, talk to your doctor before you take decongestants or anti-inflammatory medicine, such as ibuprofen. Some of these medicines can raise blood pressure. · Learn how to check your blood pressure at home. Lifestyle changes · Stay at a healthy weight. This is especially important if you put on weight around the waist. Losing even 10 pounds can help you lower your blood pressure. · If your doctor recommends it, get more exercise. Walking is a good choice. Bit by bit, increase the amount you walk every day. Try for at least 30 minutes on most days of the week. You also may want to swim, bike, or do other activities. · Avoid or limit alcohol. Talk to your doctor about whether you can drink any alcohol. · Try to limit how much sodium you eat to less than 2,300 milligrams (mg) a day. Your doctor may ask you to try to eat less than 1,500 mg a day. · Eat plenty of fruits (such as bananas and oranges), vegetables, legumes, whole grains, and low-fat dairy products. · Lower the amount of saturated fat in your diet. Saturated fat is found in animal products such as milk, cheese, and meat. Limiting these foods may help you lose weight and also lower your risk for heart disease. · Do not smoke. Smoking increases your risk for heart attack and stroke. If you need help quitting, talk to your doctor about stop-smoking programs and medicines. These can increase your chances of quitting for good. When should you call for help? Call 911 anytime you think you may need emergency care. This may mean having symptoms that suggest that your blood pressure is causing a serious heart or blood vessel problem. Your blood pressure may be over 180/120. 
 For example, call 911 if: 
  · You have symptoms of a heart attack. These may include: 
? Chest pain or pressure, or a strange feeling in the chest. 
? Sweating. ? Shortness of breath. ? Nausea or vomiting. ? Pain, pressure, or a strange feeling in the back, neck, jaw, or upper belly or in one or both shoulders or arms. ? Lightheadedness or sudden weakness. ? A fast or irregular heartbeat.  
  · You have symptoms of a stroke. These may include: 
? Sudden numbness, tingling, weakness, or loss of movement in your face, arm, or leg, especially on only one side of your body. ? Sudden vision changes. ? Sudden trouble speaking. ? Sudden confusion or trouble understanding simple statements. ? Sudden problems with walking or balance. ? A sudden, severe headache that is different from past headaches.  
  · You have severe back or belly pain.  
 Do not wait until your blood pressure comes down on its own. Get help right away. 
 Call your doctor now or seek immediate care if: 
  · Your blood pressure is much higher than normal (such as 180/120 or higher), but you don't have symptoms.  
  · You think high blood pressure is causing symptoms, such as: 
? Severe headache. 
? Blurry vision.  
 Watch closely for changes in your health, and be sure to contact your doctor if: 
  · Your blood pressure measures higher than your doctor recommends at least 2 times. That means the top number is higher or the bottom number is higher, or both.  
  · You think you may be having side effects from your blood pressure medicine. Where can you learn more? Go to http://che-meli.info/. Enter K244 in the search box to learn more about \"High Blood Pressure: Care Instructions. \" Current as of: December 6, 2017 Content Version: 11.8 © 5893-4875 [employer]. Care instructions adapted under license by [employer] (which disclaims liability or warranty for this information). If you have questions about a medical condition or this instruction, always ask your healthcare professional. [place] [address] any warranty or liability for your use of this information.

## 2019-01-07 NOTE — PROGRESS NOTES
Merissa Singh is a 79 y.o. female  presents for follow up. She needs refills of BP meds. She was seen in ER. No sxs now. No chest pains or SOB Allergies Allergen Reactions  Codeine Other (comments)  
  hallucinations  Oxycodone Unknown (comments) Outpatient Medications Marked as Taking for the 1/7/19 encounter (Office Visit) with Alexandre Jack MD  
Medication Sig Dispense Refill  NIFEdipine ER (ADALAT CC) 60 mg ER tablet Take 1 Tab by mouth daily. 30 Tab 2  
 venlafaxine-SR (EFFEXOR-XR) 37.5 mg capsule  ALPHAGAN P 0.15 % ophthalmic solution  multivitamin (ONE A DAY) tablet Take 1 Tab by mouth daily.  REFRESH CELLUVISC 1 % ophthalmic solution as needed.  dorzolamide (TRUSOPT) 2 % ophthalmic solution Patient Active Problem List  
Diagnosis Code  Essential hypertension I10  
 ACP (advance care planning) Z71.89  
 Cochlear implant in place Z96.21  
 Class 1 obesity due to excess calories without serious comorbidity with body mass index (BMI) of 31.0 to 31.9 in adult E66.09, Z68.31  Severe obesity (HCC) E66.01 Past Medical History:  
Diagnosis Date  Communication problem   
 due to severe hearing loss  Dizziness  Glaucoma  Hearing loss of both ears   
 left greater than right  High blood pressure  Menopause  Migraine  Unsteady gait Social History Socioeconomic History  Marital status:  Spouse name: Not on file  Number of children: Not on file  Years of education: Not on file  Highest education level: Not on file Tobacco Use  Smoking status: Never Smoker  Smokeless tobacco: Never Used Substance and Sexual Activity  Alcohol use: No  
 Drug use: No  
 Sexual activity: Yes Family History Problem Relation Age of Onset  Breast Cancer Mother  Diabetes Mother  Breast Cancer Other  Kidney Disease Brother Review of Systems Constitutional: Negative for chills, fever, malaise/fatigue and weight loss. Eyes: Negative for blurred vision. Respiratory: Negative for shortness of breath and wheezing. Cardiovascular: Negative for chest pain and palpitations. Gastrointestinal: Negative for nausea and vomiting. Musculoskeletal: Negative for myalgias. Skin: Negative for rash. Neurological: Negative for dizziness, weakness and headaches. Vitals:  
 01/07/19 1516 BP: (!) 166/98 Pulse: 79 Resp: 10 Temp: 97.9 °F (36.6 °C) TempSrc: Oral  
SpO2: 96% Weight: 230 lb (104.3 kg) Height: 5' 6\" (1.676 m) PainSc:   0 - No pain Physical Exam  
Constitutional: She is oriented to person, place, and time and well-developed, well-nourished, and in no distress. Neck: Normal range of motion. Neck supple. No thyromegaly present. Cardiovascular: Normal rate, regular rhythm and normal heart sounds. Pulmonary/Chest: Effort normal and breath sounds normal.  
Musculoskeletal: Normal range of motion. Neurological: She is alert and oriented to person, place, and time. Skin: Skin is warm and dry. Nursing note and vitals reviewed. Assessment/Plan ICD-10-CM ICD-9-CM 1. Essential hypertension I10 401.9 NIFEdipine ER (ADALAT CC) 60 mg ER tablet I have discussed the diagnosis with the patient and the intended plan of care as seen in the above orders. The patient has received an after-visit summary and questions were answered concerning future plans. I have discussed medication, side effects, and warnings with the patient in detail. The patient verbalized understanding and is in agreement with the plan of care. The patient will contact the office with any additional concerns. Follow-up Disposition: 
Return in about 3 months (around 4/7/2019). lab results and schedule of future lab studies reviewed with patient Vero White MD

## 2019-01-28 ENCOUNTER — OFFICE VISIT (OUTPATIENT)
Dept: FAMILY MEDICINE CLINIC | Age: 69
End: 2019-01-28

## 2019-01-28 VITALS
WEIGHT: 240 LBS | DIASTOLIC BLOOD PRESSURE: 82 MMHG | HEART RATE: 78 BPM | RESPIRATION RATE: 14 BRPM | BODY MASS INDEX: 38.57 KG/M2 | HEIGHT: 66 IN | TEMPERATURE: 98 F | SYSTOLIC BLOOD PRESSURE: 130 MMHG | OXYGEN SATURATION: 100 %

## 2019-01-28 DIAGNOSIS — R60.0 LOCALIZED EDEMA: Primary | ICD-10-CM

## 2019-01-28 DIAGNOSIS — I10 ESSENTIAL HYPERTENSION: ICD-10-CM

## 2019-01-28 RX ORDER — FUROSEMIDE 20 MG/1
20 TABLET ORAL DAILY
Qty: 90 TAB | Refills: 1 | Status: SHIPPED | OUTPATIENT
Start: 2019-01-28

## 2019-01-28 NOTE — PROGRESS NOTES
Rayray Abimaelviory 79 y.o. female being seen for Chief Complaint Patient presents with  Ankle swelling  
 
sxs x 3 weeks Trouble hearing 1. Have you been to the ER, urgent care clinic since your last visit? Hospitalized since your last visit? No 
 
2. Have you seen or consulted any other health care providers outside of the [address] [place] [address] since your last visit? Include any pap smears or colon screening. No 
 
Pharmacy has been verified Care team has been verified/updated

## 2019-01-28 NOTE — PROGRESS NOTES
Terrance Delvalle is a 79 y.o. female  presents for edema of lower ext. No chest pains or SOB  No wheezing. sxs are not getting worse. Allergies Allergen Reactions  Codeine Other (comments)  
  hallucinations  Oxycodone Unknown (comments) Outpatient Medications Marked as Taking for the 1/28/19 encounter (Office Visit) with Linnea Ybarra MD  
Medication Sig Dispense Refill  NIFEdipine ER (ADALAT CC) 60 mg ER tablet Take 1 Tab by mouth daily. 90 Tab 1  ALPHAGAN P 0.15 % ophthalmic solution  REFRESH CELLUVISC 1 % ophthalmic solution as needed.  dorzolamide (TRUSOPT) 2 % ophthalmic solution Patient Active Problem List  
Diagnosis Code  Essential hypertension I10  
 ACP (advance care planning) Z71.89  
 Cochlear implant in place Z96.21  
 Class 1 obesity due to excess calories without serious comorbidity with body mass index (BMI) of 31.0 to 31.9 in adult E66.09, Z68.31  Severe obesity (HCC) E66.01 Past Medical History:  
Diagnosis Date  Communication problem   
 due to severe hearing loss  Dizziness  Glaucoma  Hearing loss of both ears   
 left greater than right  High blood pressure  Menopause  Migraine  Unsteady gait Social History Socioeconomic History  Marital status:  Spouse name: Not on file  Number of children: Not on file  Years of education: Not on file  Highest education level: Not on file Tobacco Use  Smoking status: Never Smoker  Smokeless tobacco: Never Used Substance and Sexual Activity  Alcohol use: No  
 Drug use: No  
 Sexual activity: Yes Family History Problem Relation Age of Onset  Breast Cancer Mother  Diabetes Mother  Breast Cancer Other  Kidney Disease Brother Review of Systems Constitutional: Negative for chills, fever, malaise/fatigue and weight loss. Eyes: Negative for blurred vision. Respiratory: Negative for shortness of breath and wheezing. Cardiovascular: Positive for leg swelling. Negative for chest pain and palpitations. Gastrointestinal: Negative for nausea and vomiting. Musculoskeletal: Negative for myalgias. Skin: Negative for rash. Neurological: Negative for weakness. Vitals:  
 01/28/19 1312 BP: 130/82 Pulse: 78 Resp: 14 Temp: 98 °F (36.7 °C) TempSrc: Oral  
SpO2: 100% Weight: 240 lb (108.9 kg) Height: 5' 6\" (1.676 m) PainSc:   0 - No pain Physical Exam  
Constitutional: She is oriented to person, place, and time and well-developed, well-nourished, and in no distress. HENT:  
Nose: Nose normal.  
Mouth/Throat: Oropharynx is clear and moist.  
Neck: Normal range of motion. Neck supple. Cardiovascular: Normal rate, regular rhythm and normal heart sounds. Pulmonary/Chest: Effort normal and breath sounds normal.  
Musculoskeletal: Normal range of motion. She exhibits edema (ankles bilaterally left worse than right.). Neurological: She is alert and oriented to person, place, and time. Skin: Skin is warm and dry. Nursing note and vitals reviewed. Assessment/Plan ICD-10-CM ICD-9-CM 1. Localized edema R60.0 782.3 furosemide (LASIX) 20 mg tablet 2. Essential hypertension I10 401.9 I have discussed the diagnosis with the patient and the intended plan of care as seen in the above orders. The patient has received an after-visit summary and questions were answered concerning future plans. I have discussed medication, side effects, and warnings with the patient in detail. The patient verbalized understanding and is in agreement with the plan of care. The patient will contact the office with any additional concerns. Follow-up Disposition: 
Return if symptoms worsen or fail to improve. 
lab results and schedule of future lab studies reviewed with patient Discussed the patient's BMI with her.   The BMI follow up plan is as follows:  
 
dietary management education, guidance, and counseling 
encourage exercise 
monitor weight 
prescribed dietary intake An After Visit Summary was printed and given to the patient.  
 
Laila Jaffe MD

## 2019-04-03 DIAGNOSIS — I10 ESSENTIAL HYPERTENSION: ICD-10-CM

## 2019-04-03 RX ORDER — NIFEDIPINE 60 MG/1
60 TABLET, EXTENDED RELEASE ORAL DAILY
Qty: 90 TAB | Refills: 1 | Status: SHIPPED | OUTPATIENT
Start: 2019-04-03 | End: 2019-04-08 | Stop reason: SDUPTHER

## 2019-04-03 NOTE — TELEPHONE ENCOUNTER
Patient called at 8:45 requesting NIFEdipine ER (ADALAT CC) 60 mg ER tablet. Please call patient at [phone] when ready.

## 2019-04-08 DIAGNOSIS — I10 ESSENTIAL HYPERTENSION: ICD-10-CM

## 2019-04-08 NOTE — TELEPHONE ENCOUNTER
Patient asking her blood pressure medication to be sent to [facility], Kathrine Mcfarland is out of this medication at this time, limited supply ordered (30 w/2 refills) please review and sign if appropriate.

## 2019-04-08 NOTE — TELEPHONE ENCOUNTER
Patient called at 2:53 stating that CHARISSA anne is out of stock on her NIFEdipine ER (ADALAT CC) 60 mg and would like this medicine [facility] [address] [facility]. Please call patient if you have any questions.

## 2019-04-09 RX ORDER — NIFEDIPINE 60 MG/1
60 TABLET, EXTENDED RELEASE ORAL DAILY
Qty: 30 TAB | Refills: 2 | Status: SHIPPED | OUTPATIENT
Start: 2019-04-09 | End: 2019-01-01 | Stop reason: ALTCHOICE

## 2019-07-16 NOTE — PROGRESS NOTES
Bassem Duncan is a 79 y.o. female  presents for follow up from hospital.  She has elevated BP and has seen cardiology. No complaints today. Allergies Allergen Reactions  Codeine Other (comments)  
  hallucinations  Oxycodone Unknown (comments) Outpatient Medications Marked as Taking for the 7/16/19 encounter (Office Visit) with Bruce Fan MD  
Medication Sig Dispense Refill  cloNIDine HCl (CATAPRES) 0.1 mg tablet  lisinopril (PRINIVIL, ZESTRIL) 40 mg tablet  amLODIPine (NORVASC) 5 mg tablet  BETOPTIC S 0.25 % ophthalmic suspension  prednisoLONE acetate (PRED FORTE) 1 % ophthalmic suspension  ergocalciferol (ERGOCALCIFEROL) 50,000 unit capsule  venlafaxine-SR (EFFEXOR-XR) 37.5 mg capsule  ALPHAGAN P 0.15 % ophthalmic solution  REFRESH CELLUVISC 1 % ophthalmic solution as needed.  dorzolamide (TRUSOPT) 2 % ophthalmic solution Patient Active Problem List  
Diagnosis Code  Essential hypertension I10  
 ACP (advance care planning) Z71.89  
 Cochlear implant in place Z96.21  
 Class 1 obesity due to excess calories without serious comorbidity with body mass index (BMI) of 31.0 to 31.9 in adult E66.09, Z68.31  Severe obesity (HCC) E66.01  
 Localized edema R60.0 Past Medical History:  
Diagnosis Date  Communication problem   
 due to severe hearing loss  Dizziness  Glaucoma  Hearing loss of both ears   
 left greater than right  High blood pressure  Menopause  Migraine  Unsteady gait Social History Socioeconomic History  Marital status:  Spouse name: Not on file  Number of children: Not on file  Years of education: Not on file  Highest education level: Not on file Tobacco Use  Smoking status: Never Smoker  Smokeless tobacco: Never Used Substance and Sexual Activity  Alcohol use: No  
 Drug use: No  
 Sexual activity: Yes Family History Problem Relation Age of Onset  Breast Cancer Mother  Diabetes Mother  Breast Cancer Other  Kidney Disease Brother Review of Systems Constitutional: Negative for chills, fever, malaise/fatigue and weight loss. Eyes: Negative for blurred vision. Respiratory: Negative for shortness of breath and wheezing. Cardiovascular: Positive for leg swelling. Negative for chest pain. Gastrointestinal: Negative for nausea and vomiting. Musculoskeletal: Negative for myalgias. Skin: Negative for rash. Neurological: Negative for weakness. Vitals:  
 07/16/19 6580 BP: (!) 140/98 Pulse: 87 Resp: 12 Temp: 98.5 °F (36.9 °C) SpO2: 98% Height: 5' 6\" (1.676 m) PainSc:   0 - No pain Physical Exam  
Constitutional: She is oriented to person, place, and time and well-developed, well-nourished, and in no distress. Neck: Normal range of motion. Neck supple. No thyromegaly present. Cardiovascular: Normal rate, regular rhythm and normal heart sounds. Pulmonary/Chest: Effort normal and breath sounds normal.  
Musculoskeletal: Normal range of motion. Neurological: She is alert and oriented to person, place, and time. Skin: Skin is warm and dry. Psychiatric: Mood and affect normal.  
Nursing note and vitals reviewed. Assessment/Plan ICD-10-CM ICD-9-CM 1. Essential hypertension I10 401.9 cloNIDine HCl (CATAPRES) 0.1 mg tablet 2. Localized edema R60.0 782. 3 Will increase catapres to bid. Has follow up with cards in 2 weeks. I have discussed the diagnosis with the patient and the intended plan of care as seen in the above orders. The patient has received an after-visit summary and questions were answered concerning future plans. I have discussed medication, side effects, and warnings with the patient in detail. The patient verbalized understanding and is in agreement with the plan of care.  The patient will contact the office with any additional concerns. lab results and schedule of future lab studies reviewed with patient Anabell Martinez MD

## 2019-07-16 NOTE — PATIENT INSTRUCTIONS
High Blood Pressure: Care Instructions Overview It's normal for blood pressure to go up and down throughout the day. But if it stays up, you have high blood pressure. Another name for high blood pressure is hypertension. Despite what a lot of people think, high blood pressure usually doesn't cause headaches or make you feel dizzy or lightheaded. It usually has no symptoms. But it does increase your risk of stroke, heart attack, and other problems. You and your doctor will talk about your risks of these problems based on your blood pressure. Your doctor will give you a goal for your blood pressure. Your goal will be based on your health and your age. Lifestyle changes, such as eating healthy and being active, are always important to help lower blood pressure. You might also take medicine to reach your blood pressure goal. 
Follow-up care is a key part of your treatment and safety. Be sure to make and go to all appointments, and call your doctor if you are having problems. It's also a good idea to know your test results and keep a list of the medicines you take. How can you care for yourself at home? Medical treatment · If you stop taking your medicine, your blood pressure will go back up. You may take one or more types of medicine to lower your blood pressure. Be safe with medicines. Take your medicine exactly as prescribed. Call your doctor if you think you are having a problem with your medicine. · Talk to your doctor before you start taking aspirin every day. Aspirin can help certain people lower their risk of a heart attack or stroke. But taking aspirin isn't right for everyone, because it can cause serious bleeding. · See your doctor regularly. You may need to see the doctor more often at first or until your blood pressure comes down. · If you are taking blood pressure medicine, talk to your doctor before you take decongestants or anti-inflammatory medicine, such as ibuprofen. Some of these medicines can raise blood pressure. · Learn how to check your blood pressure at home. Lifestyle changes · Stay at a healthy weight. This is especially important if you put on weight around the waist. Losing even 10 pounds can help you lower your blood pressure. · If your doctor recommends it, get more exercise. Walking is a good choice. Bit by bit, increase the amount you walk every day. Try for at least 30 minutes on most days of the week. You also may want to swim, bike, or do other activities. · Avoid or limit alcohol. Talk to your doctor about whether you can drink any alcohol. · Try to limit how much sodium you eat to less than 2,300 milligrams (mg) a day. Your doctor may ask you to try to eat less than 1,500 mg a day. · Eat plenty of fruits (such as bananas and oranges), vegetables, legumes, whole grains, and low-fat dairy products. · Lower the amount of saturated fat in your diet. Saturated fat is found in animal products such as milk, cheese, and meat. Limiting these foods may help you lose weight and also lower your risk for heart disease. · Do not smoke. Smoking increases your risk for heart attack and stroke. If you need help quitting, talk to your doctor about stop-smoking programs and medicines. These can increase your chances of quitting for good. When should you call for help? Call 911 anytime you think you may need emergency care. This may mean having symptoms that suggest that your blood pressure is causing a serious heart or blood vessel problem. Your blood pressure may be over 180/120. 
 For example, call 911 if: 
  · You have symptoms of a heart attack. These may include: 
? Chest pain or pressure, or a strange feeling in the chest. 
? Sweating. ? Shortness of breath. ? Nausea or vomiting. ? Pain, pressure, or a strange feeling in the back, neck, jaw, or upper belly or in one or both shoulders or arms. ? Lightheadedness or sudden weakness. ? A fast or irregular heartbeat.  
  · You have symptoms of a stroke. These may include: 
? Sudden numbness, tingling, weakness, or loss of movement in your face, arm, or leg, especially on only one side of your body. ? Sudden vision changes. ? Sudden trouble speaking. ? Sudden confusion or trouble understanding simple statements. ? Sudden problems with walking or balance. ? A sudden, severe headache that is different from past headaches.  
  · You have severe back or belly pain.  
 Do not wait until your blood pressure comes down on its own. Get help right away. 
 Call your doctor now or seek immediate care if: 
  · Your blood pressure is much higher than normal (such as 180/120 or higher), but you don't have symptoms.  
  · You think high blood pressure is causing symptoms, such as: 
? Severe headache. 
? Blurry vision.  
 Watch closely for changes in your health, and be sure to contact your doctor if: 
  · Your blood pressure measures higher than your doctor recommends at least 2 times. That means the top number is higher or the bottom number is higher, or both.  
  · You think you may be having side effects from your blood pressure medicine. Where can you learn more? Go to http://che-meli.info/. Enter L709 in the search box to learn more about \"High Blood Pressure: Care Instructions. \" Current as of: July 22, 2018 Content Version: 11.9 © 5293-1881 [employer]. Care instructions adapted under license by [employer] (which disclaims liability or warranty for this information). If you have questions about a medical condition or this instruction, always ask your healthcare professional. [place] [address] any warranty or liability for your use of this information.

## 2019-07-16 NOTE — PROGRESS NOTES
Pt in office to discuss medication with Dr. Jacqueline Dugan. Pt states that cardiologist put her on Coreg. She is not currently taking that, because it was 'messing with the pressure in her eye'. 1. Have you been to the ER, urgent care clinic since your last visit? Hospitalized since your last visit? No 
 
2. Have you seen or consulted any other health care providers outside of the [address] [place] [address] since your last visit? Include any pap smears or colon screening.  No

## 2019-07-19 NOTE — TELEPHONE ENCOUNTER
Spoke with Dr. Reyes Quan in reference to this msg. He discontinued Amlodipine because it was causing swelling in her legs. She is asking for a refill on this medication. Dr. Reyes Quan denied for this very reason. Waiting on return call to confirm with the .  I am unable to communicate with the patient herself due to lack of successful communication

## 2019-07-19 NOTE — TELEPHONE ENCOUNTER
Patient called requesting a refill on her amlodipine, but while reviewing her chart I see this medication has been discontinued. Please review and advise.

## 2019-08-01 NOTE — PROGRESS NOTES
Agustina George is a 79 y.o. female  presents for follow up of HTN. She has seen nephrologist.  Her weight is down. No chest pains or SOB weakness or numbness. Allergies Allergen Reactions  Codeine Other (comments)  
  hallucinations  Oxycodone Unknown (comments) Outpatient Medications Marked as Taking for the 8/1/19 encounter (Office Visit) with Tammy Camacho MD  
Medication Sig Dispense Refill  NIFEdipine ER (ADALAT CC) 30 mg ER tablet Take  by mouth daily.  BETOPTIC S 0.25 % ophthalmic suspension  latanoprost (XALATAN) 0.005 % ophthalmic solution  cloNIDine HCl (CATAPRES) 0.1 mg tablet Take 1 Tab by mouth two (2) times a day. 60 Tab 2  
 furosemide (LASIX) 20 mg tablet Take 1 Tab by mouth daily. 90 Tab 1  ALPHAGAN P 0.15 % ophthalmic solution  dorzolamide (TRUSOPT) 2 % ophthalmic solution Patient Active Problem List  
Diagnosis Code  Essential hypertension I10  
 ACP (advance care planning) Z71.89  
 Cochlear implant in place Z96.21  
 Class 1 obesity due to excess calories without serious comorbidity with body mass index (BMI) of 31.0 to 31.9 in adult E66.09, Z68.31  Severe obesity (HCC) E66.01  
 Localized edema R60.0 Past Medical History:  
Diagnosis Date  Communication problem   
 due to severe hearing loss  Dizziness  Glaucoma  Hearing loss of both ears   
 left greater than right  High blood pressure  Menopause  Migraine  Unsteady gait Social History Socioeconomic History  Marital status:  Spouse name: Not on file  Number of children: Not on file  Years of education: Not on file  Highest education level: Not on file Tobacco Use  Smoking status: Never Smoker  Smokeless tobacco: Never Used Substance and Sexual Activity  Alcohol use: No  
 Drug use: No  
 Sexual activity: Yes Family History Problem Relation Age of Onset  Breast Cancer Mother  Diabetes Mother  Breast Cancer Other  Kidney Disease Brother Review of Systems Constitutional: Negative for chills, fever, malaise/fatigue and weight loss. Eyes: Negative for blurred vision. Respiratory: Negative for shortness of breath and wheezing. Cardiovascular: Negative for chest pain. Gastrointestinal: Negative for nausea and vomiting. Musculoskeletal: Negative for myalgias. Skin: Negative for rash. Neurological: Negative for weakness. Vitals:  
 08/01/19 1631 BP: (!) 146/100 Pulse: 90 Resp: 16 Temp: 98.1 °F (36.7 °C) TempSrc: Oral  
SpO2: 95% Weight: 211 lb (95.7 kg) Height: 5' 6\" (1.676 m) PainSc:   0 - No pain Physical Exam  
Constitutional: She is oriented to person, place, and time and well-developed, well-nourished, and in no distress. Neck: Normal range of motion. Neck supple. No thyromegaly present. Cardiovascular: Normal rate, regular rhythm and normal heart sounds. Pulmonary/Chest: Effort normal and breath sounds normal.  
Musculoskeletal: Normal range of motion. Neurological: She is alert and oriented to person, place, and time. Skin: Skin is warm and dry. Psychiatric: Mood, memory, affect and judgment normal.  
Nursing note and vitals reviewed. Assessment/Plan ICD-10-CM ICD-9-CM 1. Essential hypertension I10 401.9 2. Localized edema R60.0 782.3 I have discussed the diagnosis with the patient and the intended plan of care as seen in the above orders. The patient has received an after-visit summary and questions were answered concerning future plans. I have discussed medication, side effects, and warnings with the patient in detail. The patient verbalized understanding and is in agreement with the plan of care. The patient will contact the office with any additional concerns. lab results and schedule of future lab studies reviewed with patient Yumiko Garcia MD

## 2019-08-01 NOTE — TELEPHONE ENCOUNTER
Patient was seen in the office today and states she forgot to ask Dr. Mavis Aguirre about her Telmisartan she wants to know if this will cause her to gain domonique. She also c/o right abdominal pain x 2 days that she forgot to mention to Dr. Mavis Aguirre she has been told I will send a message to him and I will call her.

## 2019-08-01 NOTE — PATIENT INSTRUCTIONS
High Blood Pressure: Care Instructions Overview It's normal for blood pressure to go up and down throughout the day. But if it stays up, you have high blood pressure. Another name for high blood pressure is hypertension. Despite what a lot of people think, high blood pressure usually doesn't cause headaches or make you feel dizzy or lightheaded. It usually has no symptoms. But it does increase your risk of stroke, heart attack, and other problems. You and your doctor will talk about your risks of these problems based on your blood pressure. Your doctor will give you a goal for your blood pressure. Your goal will be based on your health and your age. Lifestyle changes, such as eating healthy and being active, are always important to help lower blood pressure. You might also take medicine to reach your blood pressure goal. 
Follow-up care is a key part of your treatment and safety. Be sure to make and go to all appointments, and call your doctor if you are having problems. It's also a good idea to know your test results and keep a list of the medicines you take. How can you care for yourself at home? Medical treatment · If you stop taking your medicine, your blood pressure will go back up. You may take one or more types of medicine to lower your blood pressure. Be safe with medicines. Take your medicine exactly as prescribed. Call your doctor if you think you are having a problem with your medicine. · Talk to your doctor before you start taking aspirin every day. Aspirin can help certain people lower their risk of a heart attack or stroke. But taking aspirin isn't right for everyone, because it can cause serious bleeding. · See your doctor regularly. You may need to see the doctor more often at first or until your blood pressure comes down. · If you are taking blood pressure medicine, talk to your doctor before you take decongestants or anti-inflammatory medicine, such as ibuprofen. Some of these medicines can raise blood pressure. · Learn how to check your blood pressure at home. Lifestyle changes · Stay at a healthy weight. This is especially important if you put on weight around the waist. Losing even 10 pounds can help you lower your blood pressure. · If your doctor recommends it, get more exercise. Walking is a good choice. Bit by bit, increase the amount you walk every day. Try for at least 30 minutes on most days of the week. You also may want to swim, bike, or do other activities. · Avoid or limit alcohol. Talk to your doctor about whether you can drink any alcohol. · Try to limit how much sodium you eat to less than 2,300 milligrams (mg) a day. Your doctor may ask you to try to eat less than 1,500 mg a day. · Eat plenty of fruits (such as bananas and oranges), vegetables, legumes, whole grains, and low-fat dairy products. · Lower the amount of saturated fat in your diet. Saturated fat is found in animal products such as milk, cheese, and meat. Limiting these foods may help you lose weight and also lower your risk for heart disease. · Do not smoke. Smoking increases your risk for heart attack and stroke. If you need help quitting, talk to your doctor about stop-smoking programs and medicines. These can increase your chances of quitting for good. When should you call for help? Call 911 anytime you think you may need emergency care. This may mean having symptoms that suggest that your blood pressure is causing a serious heart or blood vessel problem. Your blood pressure may be over 180/120. 
 For example, call 911 if: 
  · You have symptoms of a heart attack. These may include: 
? Chest pain or pressure, or a strange feeling in the chest. 
? Sweating. ? Shortness of breath. ? Nausea or vomiting. ? Pain, pressure, or a strange feeling in the back, neck, jaw, or upper belly or in one or both shoulders or arms. ? Lightheadedness or sudden weakness. ? A fast or irregular heartbeat.  
  · You have symptoms of a stroke. These may include: 
? Sudden numbness, tingling, weakness, or loss of movement in your face, arm, or leg, especially on only one side of your body. ? Sudden vision changes. ? Sudden trouble speaking. ? Sudden confusion or trouble understanding simple statements. ? Sudden problems with walking or balance. ? A sudden, severe headache that is different from past headaches.  
  · You have severe back or belly pain.  
 Do not wait until your blood pressure comes down on its own. Get help right away. 
 Call your doctor now or seek immediate care if: 
  · Your blood pressure is much higher than normal (such as 180/120 or higher), but you don't have symptoms.  
  · You think high blood pressure is causing symptoms, such as: 
? Severe headache. 
? Blurry vision.  
 Watch closely for changes in your health, and be sure to contact your doctor if: 
  · Your blood pressure measures higher than your doctor recommends at least 2 times. That means the top number is higher or the bottom number is higher, or both.  
  · You think you may be having side effects from your blood pressure medicine. Where can you learn more? Go to http://che-meli.info/. Enter I646 in the search box to learn more about \"High Blood Pressure: Care Instructions. \" Current as of: July 22, 2018 Content Version: 12.1 © 3018-7873 [employer]. Care instructions adapted under license by [employer] (which disclaims liability or warranty for this information). If you have questions about a medical condition or this instruction, always ask your healthcare professional. [place] [address] any warranty or liability for your use of this information.

## 2019-08-01 NOTE — PROGRESS NOTES
Eileen Davis is a 79 y.o. female presents in office for Chief Complaint Patient presents with  Medication Evaluation BP medication Visit Vitals BP (!) 146/100 (BP 1 Location: Left arm, BP Patient Position: Sitting) Pulse 90 Temp 98.1 °F (36.7 °C) (Oral) Resp 16 Ht 5' 6\" (1.676 m) Wt 211 lb (95.7 kg) SpO2 95% BMI 34.06 kg/m² Health Maintenance Due Topic Date Due  Shingrix Vaccine Age 50> (1 of 2) 12/03/2002  FOBT Q 1 YEAR AGE 50-75  12/03/2002  BREAST CANCER SCRN MAMMOGRAM  03/13/2017  GLAUCOMA SCREENING Q2Y  12/03/2017  Bone Densitometry (Dexa) Screening  12/03/2017  MEDICARE YEARLY EXAM  05/27/2018  Influenza Age 5 to Adult  08/01/2019 1. Have you been to the ER, urgent care clinic since your last visit? Hospitalized since your last visit? No 
 
2. Have you seen or consulted any other health care providers outside of the [address] [place] [address] since your last visit? Include any pap smears or colon screening. Dr. Michel Zimmer (Nephology) Learning Assessment 11/15/2016 PRIMARY LEARNER Patient PRIMARY LANGUAGE ENGLISH  
LEARNER PREFERENCE PRIMARY LISTENING  
  DEMONSTRATION  
  READING  
ANSWERED BY other RELATIONSHIP SELF

## 2019-08-06 NOTE — TELEPHONE ENCOUNTER
Debbi Andrews MD  You 2 hours ago (1:56 PM) It does not cause her to gain weight. Routing comment Called patient no answer left message asking her to call the office back.

## 2019-08-12 NOTE — TELEPHONE ENCOUNTER
Patient called the office back had her verify  she was told her Telmisartan will not cause her to gain weight, she asked if she had asked that question told patient yes at her last visit she stopped at my desk saying she forgot to ask Dr. Aroldo Lu a question and I have been trying to contact her in reference to this for a few days. Patient did not seem to remember asking this question but did say okay.

## 2019-09-19 NOTE — PATIENT INSTRUCTIONS
High Blood Pressure: Care Instructions Overview It's normal for blood pressure to go up and down throughout the day. But if it stays up, you have high blood pressure. Another name for high blood pressure is hypertension. Despite what a lot of people think, high blood pressure usually doesn't cause headaches or make you feel dizzy or lightheaded. It usually has no symptoms. But it does increase your risk of stroke, heart attack, and other problems. You and your doctor will talk about your risks of these problems based on your blood pressure. Your doctor will give you a goal for your blood pressure. Your goal will be based on your health and your age. Lifestyle changes, such as eating healthy and being active, are always important to help lower blood pressure. You might also take medicine to reach your blood pressure goal. 
Follow-up care is a key part of your treatment and safety. Be sure to make and go to all appointments, and call your doctor if you are having problems. It's also a good idea to know your test results and keep a list of the medicines you take. How can you care for yourself at home? Medical treatment · If you stop taking your medicine, your blood pressure will go back up. You may take one or more types of medicine to lower your blood pressure. Be safe with medicines. Take your medicine exactly as prescribed. Call your doctor if you think you are having a problem with your medicine. · Talk to your doctor before you start taking aspirin every day. Aspirin can help certain people lower their risk of a heart attack or stroke. But taking aspirin isn't right for everyone, because it can cause serious bleeding. · See your doctor regularly. You may need to see the doctor more often at first or until your blood pressure comes down. · If you are taking blood pressure medicine, talk to your doctor before you take decongestants or anti-inflammatory medicine, such as ibuprofen. Some of these medicines can raise blood pressure. · Learn how to check your blood pressure at home. Lifestyle changes · Stay at a healthy weight. This is especially important if you put on weight around the waist. Losing even 10 pounds can help you lower your blood pressure. · If your doctor recommends it, get more exercise. Walking is a good choice. Bit by bit, increase the amount you walk every day. Try for at least 30 minutes on most days of the week. You also may want to swim, bike, or do other activities. · Avoid or limit alcohol. Talk to your doctor about whether you can drink any alcohol. · Try to limit how much sodium you eat to less than 2,300 milligrams (mg) a day. Your doctor may ask you to try to eat less than 1,500 mg a day. · Eat plenty of fruits (such as bananas and oranges), vegetables, legumes, whole grains, and low-fat dairy products. · Lower the amount of saturated fat in your diet. Saturated fat is found in animal products such as milk, cheese, and meat. Limiting these foods may help you lose weight and also lower your risk for heart disease. · Do not smoke. Smoking increases your risk for heart attack and stroke. If you need help quitting, talk to your doctor about stop-smoking programs and medicines. These can increase your chances of quitting for good. When should you call for help? Call 911 anytime you think you may need emergency care. This may mean having symptoms that suggest that your blood pressure is causing a serious heart or blood vessel problem. Your blood pressure may be over 180/120. 
 For example, call 911 if: 
  · You have symptoms of a heart attack. These may include: 
? Chest pain or pressure, or a strange feeling in the chest. 
? Sweating. ? Shortness of breath. ? Nausea or vomiting. ? Pain, pressure, or a strange feeling in the back, neck, jaw, or upper belly or in one or both shoulders or arms. ? Lightheadedness or sudden weakness. ? A fast or irregular heartbeat.  
  · You have symptoms of a stroke. These may include: 
? Sudden numbness, tingling, weakness, or loss of movement in your face, arm, or leg, especially on only one side of your body. ? Sudden vision changes. ? Sudden trouble speaking. ? Sudden confusion or trouble understanding simple statements. ? Sudden problems with walking or balance. ? A sudden, severe headache that is different from past headaches.  
  · You have severe back or belly pain.  
 Do not wait until your blood pressure comes down on its own. Get help right away. 
 Call your doctor now or seek immediate care if: 
  · Your blood pressure is much higher than normal (such as 180/120 or higher), but you don't have symptoms.  
  · You think high blood pressure is causing symptoms, such as: 
? Severe headache. 
? Blurry vision.  
 Watch closely for changes in your health, and be sure to contact your doctor if: 
  · Your blood pressure measures higher than your doctor recommends at least 2 times. That means the top number is higher or the bottom number is higher, or both.  
  · You think you may be having side effects from your blood pressure medicine. Where can you learn more? Go to http://che-meli.info/. Enter A244 in the search box to learn more about \"High Blood Pressure: Care Instructions. \" Current as of: July 22, 2018 Content Version: 12.1 © 9838-4964 Healthwise, Incorporated. Care instructions adapted under license by [employer] (which disclaims liability or warranty for this information). If you have questions about a medical condition or this instruction, always ask your healthcare professional. [place] [address] any warranty or liability for your use of this information. Medicare Wellness Visit, Female The best way to live healthy is to have a lifestyle where you eat a well-balanced diet, exercise regularly, limit alcohol use, and quit all forms of tobacco/nicotine, if applicable. Regular preventive services are another way to keep healthy. Preventive services (vaccines, screening tests, monitoring & exams) can help personalize your care plan, which helps you manage your own care. Screening tests can find health problems at the earliest stages, when they are easiest to treat. [facility] follows the current, evidence-based guidelines published by the [facility] ([facility]) when recommending preventive services for our patients. Because we follow these guidelines, sometimes recommendations change over time as research supports it. (For example, mammograms used to be recommended annually. Even though Medicare will still pay for an annual mammogram, the newer guidelines recommend a mammogram every two years for women of average risk.) Of course, you and your doctor may decide to screen more often for some diseases, based on your risk and your health status. Preventive services for you include: - Medicare offers their members a free annual wellness visit, which is time for you and your primary care provider to discuss and plan for your preventive service needs. Take advantage of this benefit every year! 
-All adults over the age of 72 should receive the recommended pneumonia vaccines. Current USPSTF guidelines recommend a series of two vaccines for the best pneumonia protection.  
-All adults should have a flu vaccine yearly and a tetanus vaccine every 10 years. All adults age 61 and older should receive a shingles vaccine once in their lifetime.   
-A bone mass density test is recommended when a woman turns 65 to screen for osteoporosis. This test is only recommended one time, as a screening. Some providers will use this same test as a disease monitoring tool if you already have osteoporosis. -All adults age 38-68 who are overweight should have a diabetes screening test once every three years. -Other screening tests and preventive services for persons with diabetes include: an eye exam to screen for diabetic retinopathy, a kidney function test, a foot exam, and stricter control over your cholesterol.  
-Cardiovascular screening for adults with routine risk involves an electrocardiogram (ECG) at intervals determined by your doctor.  
-Colorectal cancer screenings should be done for adults age 54-65 with no increased risk factors for colorectal cancer. There are a number of acceptable methods of screening for this type of cancer. Each test has its own benefits and drawbacks. Discuss with your doctor what is most appropriate for you during your annual wellness visit. The different tests include: colonoscopy (considered the best screening method), a fecal occult blood test, a fecal DNA test, and sigmoidoscopy. -Breast cancer screenings are recommended every other year for women of normal risk, age 54-69. 
-Cervical cancer screenings for women over age 72 are only recommended with certain risk factors.  
-All adults born between [facility] should be screened once for Hepatitis C. Here is a list of your current Health Maintenance items (your personalized list of preventive services) with a due date: 
Health Maintenance Due Topic Date Due  Shingles Vaccine (1 of 2) 12/03/2002  Stool testing for trace blood  12/03/2002  Mammogram  03/13/2017  Glaucoma Screening   12/03/2017  Bone Mineral Density   12/03/2017 [facility] Annual Well Visit  05/27/2018  Flu Vaccine  08/01/2019 Advance Directives: Care Instructions Your Care Instructions An advance directive is a legal way to state your wishes at the end of your life. It tells your family and your doctor what to do if you can no longer say what you want. There are two main types of advance directives. You can change them any time that your wishes change.  
· A living will tells your family and your doctor your wishes about life support and other treatment. · A durable power of  for health care lets you name a person to make treatment decisions for you when you can't speak for yourself. This person is called a health care agent. If you do not have an advance directive, decisions about your medical care may be made by a doctor or a  who doesn't know you. It may help to think of an advance directive as a gift to the people who care for you. If you have one, they won't have to make tough decisions by themselves. Follow-up care is a key part of your treatment and safety. Be sure to make and go to all appointments, and call your doctor if you are having problems. It's also a good idea to know your test results and keep a list of the medicines you take. How can you care for yourself at home? · Discuss your wishes with your loved ones and your doctor. This way, there are no surprises. · Many states have a unique form. Or you might use a universal form that has been approved by many states. This kind of form can sometimes be completed and stored online. Your electronic copy will then be available wherever you have a connection to the Internet. In most cases, doctors will respect your wishes even if you have a form from a different state. · You don't need a  to do an advance directive. But you may want to get legal advice. · Think about these questions when you prepare an advance directive: 
? Who do you want to make decisions about your medical care if you are not able to? Many people choose a family member or close friend. ? Do you know enough about life support methods that might be used? If not, talk to your doctor so you understand. ? What are you most afraid of that might happen? You might be afraid of having pain, losing your independence, or being kept alive by machines. ? Where would you prefer to die? Choices include your home, a hospital, or a nursing home. ? Would you like to have information about hospice care to support you and your family? ? Do you want to donate organs when you die? ? Do you want certain Rest[facility] practices performed before you die? If so, put your wishes in the advance directive. · Read your advance directive every year, and make changes as needed. When should you call for help? Be sure to contact your doctor if you have any questions. Where can you learn more? Go to http://che-meli.info/. Enter R264 in the search box to learn more about \"Advance Directives: Care Instructions. \" Current as of: April 1, 2019 Content Version: 12.1 © 4113-1664 Healthwise, Incorporated. Care instructions adapted under license by [employer] (which disclaims liability or warranty for this information). If you have questions about a medical condition or this instruction, always ask your healthcare professional. [address] any warranty or liability for your use of this information.

## 2019-09-19 NOTE — ACP (ADVANCE CARE PLANNING)
Advance Care Planning (ACP) Provider [facility] Date of ACP Conversation: 09/19/19 Persons included in Conversation:  patient and family Length of ACP Conversation in minutes:  16 minutes Authorized Decision Maker (if patient is incapable of making informed decisions): This person is:  
Healthcare Agent/Medical Power of  under Advance Directive For Patients with Decision Making Capacity:  
Values/Goals: Exploration of values, goals, and preferences if recovery is not expected, even with continued medical treatment in the event of:  Imminent death Severe, permanent brain injury Conversation Outcomes / Follow-Up Plan:  
Recommended completion of Advance Directive form after review of ACP materials and conversation with prospective healthcare agent

## 2019-09-19 NOTE — PROGRESS NOTES
Mihir Sapp is a 79 y.o. female  presents for follow up BP. No chest pain or SOB. Allergies Allergen Reactions  Codeine Other (comments)  
  hallucinations  Oxycodone Unknown (comments) Outpatient Medications Marked as Taking for the 9/19/19 encounter (Office Visit) with Javier Lara MD  
Medication Sig Dispense Refill  telmisartan (MICARDIS) 80 mg tablet Take 80 mg by mouth daily.  BETOPTIC S 0.25 % ophthalmic suspension  prednisoLONE acetate (PRED FORTE) 1 % ophthalmic suspension  latanoprost (XALATAN) 0.005 % ophthalmic solution  cloNIDine HCl (CATAPRES) 0.1 mg tablet Take 1 Tab by mouth two (2) times a day. 60 Tab 2  
 furosemide (LASIX) 20 mg tablet Take 1 Tab by mouth daily. 90 Tab 1  
 ergocalciferol (ERGOCALCIFEROL) 50,000 unit capsule  venlafaxine-SR (EFFEXOR-XR) 37.5 mg capsule  ALPHAGAN P 0.15 % ophthalmic solution  multivitamin (ONE A DAY) tablet Take 1 Tab by mouth daily.  REFRESH CELLUVISC 1 % ophthalmic solution as needed.  dorzolamide (TRUSOPT) 2 % ophthalmic solution Patient Active Problem List  
Diagnosis Code  Essential hypertension I10  
 ACP (advance care planning) Z71.89  
 Cochlear implant in place Z96.21  
 Class 1 obesity due to excess calories without serious comorbidity with body mass index (BMI) of 31.0 to 31.9 in adult E66.09, Z68.31  Severe obesity (HCC) E66.01  
 Localized edema R60.0 Past Medical History:  
Diagnosis Date  Communication problem   
 due to severe hearing loss  Dizziness  Glaucoma  Hearing loss of both ears   
 left greater than right  High blood pressure  Menopause  Migraine  Unsteady gait Social History Socioeconomic History  Marital status:  Spouse name: Not on file  Number of children: Not on file  Years of education: Not on file  Highest education level: Not on file Tobacco Use  
  Smoking status: Never Smoker  Smokeless tobacco: Never Used Substance and Sexual Activity  Alcohol use: No  
 Drug use: No  
 Sexual activity: Yes Family History Problem Relation Age of Onset  Breast Cancer Mother  Diabetes Mother  Breast Cancer Other  Kidney Disease Brother Review of Systems Constitutional: Negative for chills, fever, malaise/fatigue and weight loss. Eyes: Negative for blurred vision. Respiratory: Negative for shortness of breath and wheezing. Cardiovascular: Negative for chest pain. Gastrointestinal: Negative for nausea and vomiting. Musculoskeletal: Negative for myalgias. Skin: Negative for rash. Neurological: Negative for weakness. Psychiatric/Behavioral: Negative. Vitals:  
 09/19/19 1118 BP: (!) 140/92 Pulse: 69 Resp: 12 SpO2: 99% Weight: 211 lb (95.7 kg) Height: 5' 6\" (1.676 m) Physical Exam  
Constitutional: She is oriented to person, place, and time and well-developed, well-nourished, and in no distress. Neck: Normal range of motion. Neck supple. No thyromegaly present. Cardiovascular: Normal rate, regular rhythm and normal heart sounds. Pulmonary/Chest: Effort normal and breath sounds normal.  
Musculoskeletal: Normal range of motion. Neurological: She is alert and oriented to person, place, and time. Skin: Skin is warm and dry. Nursing note and vitals reviewed. Assessment/Plan ICD-10-CM ICD-9-CM 1. Essential hypertension I10 401.9 2. Dementia associated with other underlying disease with behavioral disturbance F02.81 294.8 REFERRAL TO NEUROLOGY  
  294.11   
3. Medicare annual wellness visit, subsequent Z00.00 V70.0 4. ACP (advance care planning) Z71.89 V65.49   
5.  Skin lesion L98.9 709.9 clobetasol-emollient (TEMOVATE-E) 0.05 % topical cream  
 
I have discussed the diagnosis with the patient and the intended plan of care as seen in the above orders. The patient has received an after-visit summary and questions were answered concerning future plans. I have discussed medication, side effects, and warnings with the patient in detail. The patient verbalized understanding and is in agreement with the plan of care. The patient will contact the office with any additional concerns. lab results and schedule of future lab studies reviewed with patient Katharine Estrada MD 
 
This is the Subsequent Medicare Annual Wellness Exam, performed 12 months or more after the Initial AWV or the last Subsequent AWV I have reviewed the patient's medical history in detail and updated the computerized patient record. History Past Medical History:  
Diagnosis Date  Communication problem   
 due to severe hearing loss  Dizziness  Glaucoma  Hearing loss of both ears   
 left greater than right  High blood pressure  Menopause  Migraine  Unsteady gait Past Surgical History:  
Procedure Laterality Date [address] HYSTERE[address]OMY    
 1995  HX OTHER SURGICAL    
 eye surgery - trabeculectomy Current Outpatient Medications Medication Sig Dispense Refill  telmisartan (MICARDIS) 80 mg tablet Take 80 mg by mouth daily.  BETOPTIC S 0.25 % ophthalmic suspension  prednisoLONE acetate (PRED FORTE) 1 % ophthalmic suspension  latanoprost (XALATAN) 0.005 % ophthalmic solution  cloNIDine HCl (CATAPRES) 0.1 mg tablet Take 1 Tab by mouth two (2) times a day. 60 Tab 2  
 furosemide (LASIX) 20 mg tablet Take 1 Tab by mouth daily. 90 Tab 1  
 ergocalciferol (ERGOCALCIFEROL) 50,000 unit capsule  venlafaxine-SR (EFFEXOR-XR) 37.5 mg capsule  ALPHAGAN P 0.15 % ophthalmic solution  multivitamin (ONE A DAY) tablet Take 1 Tab by mouth daily.  REFRESH CELLUVISC 1 % ophthalmic solution as needed.  dorzolamide (TRUSOPT) 2 % ophthalmic solution  NIFEdipine ER (ADALAT CC) 30 mg ER tablet Take  by mouth daily.  lisinopril (PRINIVIL, ZESTRIL) 40 mg tablet Allergies Allergen Reactions  Codeine Other (comments)  
  hallucinations  Oxycodone Unknown (comments) Family History Problem Relation Age of Onset  Breast Cancer Mother  Diabetes Mother  Breast Cancer Other  Kidney Disease Brother Social History Tobacco Use  Smoking status: Never Smoker  Smokeless tobacco: Never Used Substance Use Topics  Alcohol use: No  
 
Patient Active Problem List  
Diagnosis Code  Essential hypertension I10  
 ACP (advance care planning) Z71.89  
 Cochlear implant in place Z96.21  
 Class 1 obesity due to excess calories without serious comorbidity with body mass index (BMI) of 31.0 to 31.9 in adult E66.09, Z68.31  Severe obesity (HCC) E66.01  
 Localized edema R60.0 Depression Risk Factor Screening:  
 
3 most recent PHQ Screens 1/7/2019 Little interest or pleasure in doing things Not at all Feeling down, depressed, irritable, or hopeless Not at all Total Score PHQ 2 0 Alcohol Risk Factor Screening: You do not drink alcohol or very rarely. Functional Ability and Level of Safety:  
Hearing Loss The patient needs further evaluation. Activities of Daily Living The home contains: no safety equipment. Patient does total self care Fall Risk Fall Risk Assessment, last 12 mths 7/16/2019 Able to walk? Yes Fall in past 12 months? No  
 
 
Abuse Screen Patient is not abused Cognitive Screening Evaluation of Cognitive Function: 
Has your family/caregiver stated any concerns about your memory: no 
Normal 
 
Patient Care Team  
Patient Care Team: 
Darrell Marcial MD as PCP - [facility]) Assessment/Plan Education and counseling provided: 
Are appropriate based on today's review and evaluation Diagnoses and all orders for this visit: 1. Essential hypertension 2. Dementia associated with other underlying disease with behavioral disturbance 
-     REFERRAL TO NEUROLOGY 3. Medicare annual wellness visit, subsequent 4. ACP (advance care planning) Health Maintenance Due Topic Date Due  Shingrix Vaccine Age 50> (1 of 2) 12/03/2002  FOBT Q 1 YEAR AGE 50-75  12/03/2002  BREAST CANCER SCRN MAMMOGRAM  03/13/2017  GLAUCOMA SCREENING Q2Y  12/03/2017  Bone Densitometry (Dexa) Screening  12/03/2017  MEDICARE YEARLY EXAM  05/27/2018  Influenza Age 5 to Adult  08/01/2019 Vangie Ojeda MD

## 2019-10-11 NOTE — PROGRESS NOTES
Pt in office for ear congestion. 1. Have you been to the ER, urgent care clinic since your last visit? Hospitalized since your last visit? No 
 
2. Have you seen or consulted any other health care providers outside of the [address] [place] [address] since your last visit? Include any pap smears or colon screening.  No

## 2019-10-11 NOTE — PATIENT INSTRUCTIONS
Middle Ear Fluid: Care Instructions Your Care Instructions Fluid often builds up inside the ear during a cold or allergies. Usually the fluid drains away, but sometimes a small tube in the ear, called the eustachian tube, stays blocked for months. Symptoms of fluid buildup may include: · Popping, ringing, or a feeling of fullness or pressure in the ear. · Trouble hearing. · Balance problems and dizziness. In most cases, you can treat yourself at home. Follow-up care is a key part of your treatment and safety. Be sure to make and go to all appointments, and call your doctor if you are having problems. It's also a good idea to know your test results and keep a list of the medicines you take. How can you care for yourself at home? · In most cases, the fluid clears up within a few months without treatment. You may need more tests if the fluid does not clear up after 3 months. · If your doctor prescribed antibiotics, take them as directed. Do not stop taking them just because you feel better. You need to take the full course of antibiotics. When should you call for help? Call your doctor now or seek immediate medical care if: 
  · You have symptoms of infection, such as: 
? Increased pain, swelling, warmth, or redness. ? Pus draining from the area. ? A fever.  
 Watch closely for changes in your health, and be sure to contact your doctor if: 
  · You notice changes in hearing.  
  · You do not get better as expected. Where can you learn more? Go to http://che-meli.info/. Enter P488 in the search box to learn more about \"Middle Ear Fluid: Care Instructions. \" Current as of: October 21, 2018 Content Version: 12.2 © 8902-6322 [employer]. Care instructions adapted under license by [employer] (which disclaims liability or warranty for this information).  If you have questions about a medical condition or this instruction, always ask your healthcare professional. [place] [address] any warranty or liability for your use of this information.

## 2019-10-11 NOTE — PROGRESS NOTES
Edgardo Abad is a 79 y.o. female  presents for ear pain on right. She has assoc congestion. No fever or chills. No weakness or numbness. Allergies Allergen Reactions  Codeine Other (comments)  
  hallucinations  Oxycodone Unknown (comments) Outpatient Medications Marked as Taking for the 10/11/19 encounter (Office Visit) with Michelle Knight MD  
Medication Sig Dispense Refill  metoprolol tartrate (LOPRESSOR PO) Take  by mouth.  clobetasol (TEMOVATE) 0.05 % ointment  telmisartan (MICARDIS) 80 mg tablet Take 80 mg by mouth daily.  clobetasol-emollient (TEMOVATE-E) 0.05 % topical cream Apply  to affected area two (2) times a day. 30 g 2  
 NIFEdipine ER (ADALAT CC) 30 mg ER tablet Take  by mouth daily.  lisinopril (PRINIVIL, ZESTRIL) 40 mg tablet  BETOPTIC S 0.25 % ophthalmic suspension  prednisoLONE acetate (PRED FORTE) 1 % ophthalmic suspension  latanoprost (XALATAN) 0.005 % ophthalmic solution  cloNIDine HCl (CATAPRES) 0.1 mg tablet Take 1 Tab by mouth two (2) times a day. 60 Tab 2  
 furosemide (LASIX) 20 mg tablet Take 1 Tab by mouth daily. 90 Tab 1  
 ergocalciferol (ERGOCALCIFEROL) 50,000 unit capsule  venlafaxine-SR (EFFEXOR-XR) 37.5 mg capsule  ALPHAGAN P 0.15 % ophthalmic solution  multivitamin (ONE A DAY) tablet Take 1 Tab by mouth daily.  REFRESH CELLUVISC 1 % ophthalmic solution as needed.  dorzolamide (TRUSOPT) 2 % ophthalmic solution Patient Active Problem List  
Diagnosis Code  Essential hypertension I10  
 ACP (advance care planning) Z71.89  
 Cochlear implant in place Z96.21  
 Class 1 obesity due to excess calories without serious comorbidity with body mass index (BMI) of 31.0 to 31.9 in adult E66.09, Z68.31  Severe obesity (HCC) E66.01  
 Localized edema R60.0 Past Medical History:  
Diagnosis Date  Communication problem   
 due to severe hearing loss  Dizziness  Glaucoma  Hearing loss of both ears   
 left greater than right  High blood pressure  Menopause  Migraine  Unsteady gait Social History Socioeconomic History  Marital status:  Spouse name: Not on file  Number of children: Not on file  Years of education: Not on file  Highest education level: Not on file Tobacco Use  Smoking status: Never Smoker  Smokeless tobacco: Never Used Substance and Sexual Activity  Alcohol use: No  
 Drug use: No  
 Sexual activity: Yes Family History Problem Relation Age of Onset  Breast Cancer Mother  Diabetes Mother  Breast Cancer Other  Kidney Disease Brother Review of Systems Constitutional: Negative for chills, fever, malaise/fatigue and weight loss. HENT: Positive for congestion. Eyes: Negative for blurred vision. Respiratory: Positive for cough. Negative for shortness of breath and wheezing. Cardiovascular: Negative for chest pain. Gastrointestinal: Negative for nausea and vomiting. Musculoskeletal: Negative for myalgias. Skin: Negative for rash. Neurological: Negative for weakness. Vitals:  
 10/11/19 1003 BP: (!) 138/96 Pulse: 88 Resp: 12 Temp: 98.1 °F (36.7 °C) SpO2: 98% Weight: 206 lb (93.4 kg) Height: 5' 6\" (1.676 m) PainSc:   0 - No pain Physical Exam  
Constitutional: She is oriented to person, place, and time and well-developed, well-nourished, and in no distress. Neck: Normal range of motion. Neck supple. No thyromegaly present. Cardiovascular: Normal rate, regular rhythm and normal heart sounds. Pulmonary/Chest: Effort normal and breath sounds normal.  
Musculoskeletal: Normal range of motion. Neurological: She is alert and oriented to person, place, and time. Skin: Skin is warm and dry. Nursing note and vitals reviewed. Assessment/Plan ICD-10-CM ICD-9-CM 1. Non-recurrent acute serous otitis media of right ear H65.01 381.01 amoxicillin-clavulanate (AUGMENTIN) 500-125 mg per tablet I have discussed the diagnosis with the patient and the intended plan of care as seen in the above orders. The patient has received an after-visit summary and questions were answered concerning future plans. I have discussed medication, side effects, and warnings with the patient in detail. The patient verbalized understanding and is in agreement with the plan of care. The patient will contact the office with any additional concerns. lab results and schedule of future lab studies reviewed with patient Inna Hutson MD

## 2019-10-14 NOTE — TELEPHONE ENCOUNTER
[facility]) let me know that Ms. Abner Carmen was calling back again. I spoke to patient and she wanted to make sure she could take Augmentin and Micardis together. I consulted dr Lisa Bailon and he said that she can take both. Pt was made aware that she can take both medication together. Pt expressed clear understanding and had no further questions.

## 2019-11-21 NOTE — TELEPHONE ENCOUNTER
Patients  came into the office today asking to speak with Dr. Flori Burkett about patient he states she was in the ICU at [facility] and will need home health. He wants to know will Dr. Flori Burkett be able to refer her to home health and sign the orders. He is asking to speak directly with provider, please advise.

## 2019-11-25 NOTE — TELEPHONE ENCOUNTER
Tamia Mullen (female) from [facility] called wanting to know if Dr. Sonny Hardwick will follow Mrs. Nancy Nassar for [facility]. Including sign orders, plan of care and treatments. Please contact home health to let them know if Dr. Sonny Hardwick will be following. Phone number is: [phone]. Fax number is: [phone].

## 2019-11-29 NOTE — TELEPHONE ENCOUNTER
Patients  came to the office today with his two daughters requesting to speak with Dr. Hosey Dakin he was told he is in patient care. I was able to speak with Mr. Kavya Courtney and his daughters who are asking for referrals to be placed to Neurology as well as Nephrology. They are also asking for an order for Open MRI of the brain for patient. They do not want to be referred to Dr. Keya Brown or anyone in that office. Mr. Kavya Courtney is also asking about counseling for him and his family to know what to expect from this situation with patient. Referrals have been pended please review and sign if appropriate and please place order for MRI of brain to for swelling.

## 2019-12-18 NOTE — TELEPHONE ENCOUNTER
Patient  call to inform Dr. Sweta William that his wife is in the hospital at [facility] urgent care.

## 2020-02-16 NOTE — PROGRESS NOTES
Vladislav Smiley is a 74 y.o. female  presents for lip lesion. She has had sxs for several days. Allergies   Allergen Reactions    Codeine Other (comments)     hallucinations    Oxycodone Unknown (comments)     Outpatient Prescriptions Marked as Taking for the 6/27/18 encounter (Office Visit) with Eli Crews MD   Medication Sig Dispense Refill    venlafaxine-SR [facility] 37.5 mg capsule       NIFEdipine ER (ADALAT CC) 60 mg ER tablet Take 60 mg by mouth daily.  ALPHAGAN P 0.15 % ophthalmic solution       multivitamin (ONE A DAY) tablet Take 1 Tab by mouth daily.  REFRESH CELLUVISC 1 % ophthalmic solution as needed.  dorzolamide (TRUSOPT) 2 % ophthalmic solution        Patient Active Problem List   Diagnosis Code    Essential hypertension I10    ACP (advance care planning) Z71.89    Cochlear implant in place Z96.21    Class 1 obesity due to excess calories without serious comorbidity with body mass index (BMI) of 31.0 to 31.9 in adult E66.09, Z68.31     Past Medical History:   Diagnosis Date    Communication problem     due to severe hearing loss    Dizziness     Glaucoma     Hearing loss of both ears     left greater than right    High blood pressure     Menopause     Migraine     Unsteady gait      Social History     Social History    Marital status:      Spouse name: N/A    Number of children: N/A    Years of education: N/A     Social History Main Topics    Smoking status: Never Smoker    Smokeless tobacco: Never Used    Alcohol use No    Drug use: No    Sexual activity: Yes     Other Topics Concern    None     Social History Narrative     Family History   Problem Relation Age of Onset    Breast Cancer Mother     Diabetes Mother     Breast Cancer Other     Kidney Disease Brother         Review of Systems   Constitutional: Negative for chills and fever. Cardiovascular: Negative for chest pain. Gastrointestinal: Negative for nausea and vomiting. Skin: Positive for rash. Negative for itching. Vitals:    06/27/18 1021   BP: 127/76   Pulse: 70   Resp: 12   Temp: 97.8 °F (36.6 °C)   TempSrc: Oral   SpO2: 99%   Weight: 196 lb 3.2 oz (89 kg)   Height: 5' 6\" (1.676 m)   PainSc:   0 - No pain       Physical Exam   Constitutional: She is well-developed, well-nourished, and in no distress. HENT:   Nose: Nose normal.   Mouth/Throat: Oropharynx is clear and moist.   Neck: Normal range of motion. Neck supple. Cardiovascular: Normal rate, regular rhythm and normal heart sounds. Pulmonary/Chest: Effort normal and breath sounds normal.   Neurological: She is alert. Skin: Skin is warm and dry. Rash noted. No erythema. Upper lip with papula rash with some scaling and crusting. Nursing note and vitals reviewed. Assessment/Plan      ICD-10-CM ICD-9-CM    1. Impetigo L01.00 684 cephALEXin (KEFLEX) 500 mg capsule     I have discussed the diagnosis with the patient and the intended plan of care as seen in the above orders. The patient has received an after-visit summary and questions were answered concerning future plans. I have discussed medication, side effects, and warnings with the patient in detail. The patient verbalized understanding and is in agreement with the plan of care. The patient will contact the office with any additional concerns.       Follow-up Disposition:  Return if symptoms worsen or fail to improve.  lab results and schedule of future lab studies reviewed with patient    Sally Kate MD clear

## 2020-07-24 NOTE — TELEPHONE ENCOUNTER
Called to let pt know meds have been sent to pharmacy. Left message for patient at home number requesting return call. Clear bilaterally, pupils equal, round and reactive to light.
